# Patient Record
Sex: FEMALE | Race: WHITE | NOT HISPANIC OR LATINO | Employment: FULL TIME | RURAL
[De-identification: names, ages, dates, MRNs, and addresses within clinical notes are randomized per-mention and may not be internally consistent; named-entity substitution may affect disease eponyms.]

---

## 2024-02-19 ENCOUNTER — OFFICE VISIT (OUTPATIENT)
Dept: FAMILY MEDICINE CLINIC | Facility: CLINIC | Age: 44
End: 2024-02-19
Payer: COMMERCIAL

## 2024-02-19 VITALS
HEART RATE: 83 BPM | WEIGHT: 122.06 LBS | OXYGEN SATURATION: 99 % | BODY MASS INDEX: 20.34 KG/M2 | SYSTOLIC BLOOD PRESSURE: 136 MMHG | HEIGHT: 65 IN | DIASTOLIC BLOOD PRESSURE: 88 MMHG

## 2024-02-19 DIAGNOSIS — R22.32 MASS OF LEFT WRIST: ICD-10-CM

## 2024-02-19 DIAGNOSIS — I25.2 HISTORY OF MI (MYOCARDIAL INFARCTION): ICD-10-CM

## 2024-02-19 DIAGNOSIS — R20.2 PARESTHESIA: Primary | ICD-10-CM

## 2024-02-19 DIAGNOSIS — I25.10 CORONARY ARTERY DISEASE INVOLVING NATIVE CORONARY ARTERY OF NATIVE HEART WITHOUT ANGINA PECTORIS: ICD-10-CM

## 2024-02-19 DIAGNOSIS — M54.2 NECK PAIN: ICD-10-CM

## 2024-02-19 PROCEDURE — 1160F RVW MEDS BY RX/DR IN RCRD: CPT | Performed by: NURSE PRACTITIONER

## 2024-02-19 PROCEDURE — 1159F MED LIST DOCD IN RCRD: CPT | Performed by: NURSE PRACTITIONER

## 2024-02-19 PROCEDURE — 99203 OFFICE O/P NEW LOW 30 MIN: CPT | Performed by: NURSE PRACTITIONER

## 2024-02-19 RX ORDER — FLUOXETINE HYDROCHLORIDE 20 MG/1
60 CAPSULE ORAL DAILY
COMMUNITY
Start: 2023-10-16

## 2024-02-19 RX ORDER — BUSPIRONE HYDROCHLORIDE 30 MG/1
TABLET ORAL
COMMUNITY
Start: 2023-10-16

## 2024-02-19 RX ORDER — PANTOPRAZOLE SODIUM 20 MG/1
TABLET, DELAYED RELEASE ORAL
COMMUNITY
Start: 2023-11-05

## 2024-02-19 RX ORDER — ATORVASTATIN CALCIUM 40 MG/1
1 TABLET, FILM COATED ORAL NIGHTLY
COMMUNITY
Start: 2023-10-20

## 2024-02-19 RX ORDER — PREDNISONE 20 MG/1
TABLET ORAL
Qty: 14 TABLET | Refills: 0 | Status: SHIPPED | OUTPATIENT
Start: 2024-02-19

## 2024-02-19 NOTE — ASSESSMENT & PLAN NOTE
X-ray completed.  Will let know if radiologist sees anything.  We will try course of prednisone.  Avoid NSAIDs while on prednisone.  She states she does fine with prednisone and safe to take.  Will place orthopedic referral.  Go to ED if worsens.  Return to clinic or ED with any issues or concerns.

## 2024-02-19 NOTE — PROGRESS NOTES
"Chief Complaint  Hands Numb    Manish Maya presents to [facility]  History of Present Illness    Patient presents with concerns of numbness in bilateral hands mainly in the middle finger and thumb for the past couple of weeks.  No redness warmth no swelling.  States she does have a history of carpal tunnel and is unsure if it is just related to that but states this is different than usual.  States for over a year she has had soft nontender masses present to the underside and top of her left wrist.  Would like a referral to a specialist to have these masses evaluated further.  She also states that all this started she noticed she did something to her neck and was having posterior neck pain which continues but is improving.  Is unsure if this numbness in the hand is from carpal tunnel or if it could be something in her neck.  She states her  seems off as well due to the numbness.  Good pulses.  Good capillary refill.  No redness warmth no swelling.  No obvious injury.    She also states she will be a new patient here and plans on following up within the next month or so for routine physical and preventative exam.  She states she does have a history of degenerative disc disease as well as coronary artery disease with a history of a heart attack.  She states she was seeing specialist up in [facility] but is now down here so is requesting I go ahead and put a referral in so that she can establish care with a cardiologist around here.  No dizziness no headache no chest pain no chest pressure no shortness of breath no trouble breathing no urinary or bowel issues.    Objective   Vital Signs:   /88   Pulse 100   Ht 165.1 cm (65\")   Wt 55.4 kg (122 lb 1 oz)   SpO2 99%   BMI 20.31 kg/m²     Body mass index is 20.31 kg/m².    Review of Systems   Constitutional:  Negative for chills, fatigue and fever.   HENT:  Negative for congestion.    Eyes:  Negative " for visual disturbance.   Respiratory:  Negative for cough, shortness of breath and wheezing.    Cardiovascular: Negative.    Gastrointestinal:  Negative for abdominal pain, diarrhea, nausea and vomiting.   Genitourinary:  Negative for decreased urine volume, dysuria, frequency, hematuria and urgency.   Musculoskeletal:  Positive for neck pain. Negative for back pain.   Skin:  Negative for color change, rash and wound.   Neurological:  Positive for numbness. Negative for dizziness, weakness and headache.   Psychiatric/Behavioral:  Negative for suicidal ideas.        Past History:  Medical History: has no past medical history on file.   Surgical History: has no past surgical history on file.   Family History: family history is not on file.   Social History: reports that she has been smoking cigarettes. She has been smoking an average of .25 packs per day. She has never used smokeless tobacco.    PHQ-2 Depression Screening  Little interest or pleasure in doing things? 0-->not at all   Feeling down, depressed, or hopeless? 0-->not at all   PHQ-2 Total Score 0        PHQ-9 Depression Screening  Little interest or pleasure in doing things? 0-->not at all   Feeling down, depressed, or hopeless? 0-->not at all   Trouble falling or staying asleep, or sleeping too much?     Feeling tired or having little energy?     Poor appetite or overeating?     Feeling bad about yourself - or that you are a failure or have let yourself or your family down?     Trouble concentrating on things, such as reading the newspaper or watching television?     Moving or speaking so slowly that other people could have noticed? Or the opposite - being so fidgety or restless that you have been moving around a lot more than usual?     Thoughts that you would be better off dead, or of hurting yourself in some way?     PHQ-9 Total Score 0   If you checked off any problems, how difficult have these problems made it for you to do your work, take care of  things at home, or get along with other people?       PHQ-9 Total Score: 0      Patient screened positive for depression based on a PHQ-9 score of 0 on 2/19/2024. Follow-up recommendations include:          Current Outpatient Medications:     atorvastatin (LIPITOR) 40 MG tablet, Take 1 tablet by mouth Every Night., Disp: , Rfl:     busPIRone (BUSPAR) 30 MG tablet, TAKE ONE (1) TABLET BY MOUTH TWICE DAILY *PATIENT NEEDS APPOINTMENT FOR FURTHER REFILLS*, Disp: , Rfl:     FLUoxetine (PROzac) 20 MG capsule, Take 3 capsules by mouth Daily., Disp: , Rfl:     metoprolol tartrate (LOPRESSOR) 25 MG tablet, Take 1 tablet by mouth 2 (Two) Times a Day., Disp: , Rfl:     pantoprazole (PROTONIX) 20 MG EC tablet, , Disp: , Rfl:     predniSONE (DELTASONE) 20 MG tablet, Take 2 tabs PO qd x 5 days then 1 tab PO qd x 3 days then 0.5 tab PO qd x 2 days, Disp: 14 tablet, Rfl: 0   (Not in a hospital admission)     Allergies: Lisinopril, Morphine, Doxycycline, Aspirin, Duloxetine, Hydrocodone, Methadone, and Pregabalin    Physical Exam  Constitutional:       Appearance: Normal appearance.   Eyes:      Conjunctiva/sclera: Conjunctivae normal.      Pupils: Pupils are equal, round, and reactive to light.   Cardiovascular:      Rate and Rhythm: Normal rate and regular rhythm.      Pulses: Normal pulses.      Heart sounds: Normal heart sounds.   Pulmonary:      Effort: Pulmonary effort is normal.      Breath sounds: Normal breath sounds.   Musculoskeletal:      Right wrist: Normal.      Left wrist: Normal.      Right hand: Normal.      Left hand: Normal.      Cervical back: No swelling, edema, deformity, erythema, rigidity, spasms, bony tenderness or crepitus. Pain with movement present. Normal range of motion.      Comments: States posterior neck feels stiff with ROM    Skin:     General: Skin is warm.      Findings: No erythema or rash.      Comments: Soft small non-tender mass present to underside and top of left wrist. No redness, no  warmth   Neurological:      General: No focal deficit present.      Mental Status: She is alert and oriented to person, place, and time. Mental status is at baseline.      Sensory: No sensory deficit.      Motor: No weakness.   Psychiatric:         Mood and Affect: Mood normal.         Behavior: Behavior normal.         Thought Content: Thought content normal.         Judgment: Judgment normal.          Result Review :                   Assessment and Plan    Diagnoses and all orders for this visit:    1. Paresthesia (Primary)  Assessment & Plan:  X-ray completed.  Will let know if radiologist sees anything.  We will try course of prednisone.  Avoid NSAIDs while on prednisone.  She states she does fine with prednisone and safe to take.  Will place orthopedic referral.  Will order nerve conduction study.  Informed patient to call afterwards for results.  Go to ED if worsens.  Return to clinic or ED with any issues or concerns.    Orders:  -     XR Spine Cervical 2 or 3 View; Future  -     predniSONE (DELTASONE) 20 MG tablet; Take 2 tabs PO qd x 5 days then 1 tab PO qd x 3 days then 0.5 tab PO qd x 2 days  Dispense: 14 tablet; Refill: 0  -     Ambulatory Referral to Orthopedic Surgery  -     Nerve Conduction Test; Future    2. Neck pain  Assessment & Plan:  X-ray completed.  Will let know if radiologist sees anything.  We will try course of prednisone.  Avoid NSAIDs while on prednisone.  She states she does fine with prednisone and safe to take.  Will place orthopedic referral.  Go to ED if worsens.  Return to clinic or ED with any issues or concerns.    Orders:  -     XR Spine Cervical 2 or 3 View; Future  -     predniSONE (DELTASONE) 20 MG tablet; Take 2 tabs PO qd x 5 days then 1 tab PO qd x 3 days then 0.5 tab PO qd x 2 days  Dispense: 14 tablet; Refill: 0  -     Ambulatory Referral to Orthopedic Surgery    3. Coronary artery disease involving native coronary artery of native heart without angina  pectoris  Assessment & Plan:  Continue current medications.  Will place cardiology referral.  Proper diet and exercise plan discussed and encouraged.  Smoking cessation discussed and encouraged.  Patient states she will follow-up within the next month for routine physical and routine checkup and states she will do all preventative measures and blood work at that appointment.  Return to clinic or ED with any issues or concerns.    Orders:  -     Ambulatory Referral to Cardiology    4. History of MI (myocardial infarction)  Assessment & Plan:  Continue current medications.  Will place cardiology referral.  Proper diet and exercise plan discussed and encouraged.  Smoking cessation discussed and encouraged.  Patient states she will follow-up within the next month for routine physical and routine checkup and states she will do all preventative measures and blood work at that appointment.  Return to clinic or ED with any issues or concerns.    Orders:  -     Ambulatory Referral to Cardiology    5. Mass of left wrist  Assessment & Plan:  Will place orthopedic referral for further evaluation.    Orders:  -     Ambulatory Referral to Orthopedic Surgery              BMI is within normal parameters. No other follow-up for BMI required.       Follow Up   Return in about 2 weeks (around 3/4/2024), or if symptoms worsen or fail to improve, for Annual physical.  Patient was given instructions and counseling regarding her condition or for health maintenance advice. Please see specific information pulled into the AVS if appropriate.     MERYL Israel

## 2024-02-19 NOTE — ASSESSMENT & PLAN NOTE
Continue current medications.  Will place cardiology referral.  Proper diet and exercise plan discussed and encouraged.  Smoking cessation discussed and encouraged.  Patient states she will follow-up within the next month for routine physical and routine checkup and states she will do all preventative measures and blood work at that appointment.  Return to clinic or ED with any issues or concerns.

## 2024-02-19 NOTE — ASSESSMENT & PLAN NOTE
X-ray completed.  Will let know if radiologist sees anything.  We will try course of prednisone.  Avoid NSAIDs while on prednisone.  She states she does fine with prednisone and safe to take.  Will place orthopedic referral.  Will order nerve conduction study.  Informed patient to call afterwards for results.  Go to ED if worsens.  Return to clinic or ED with any issues or concerns.

## 2024-02-23 DIAGNOSIS — M54.2 NECK PAIN: Primary | ICD-10-CM

## 2024-02-23 DIAGNOSIS — R20.2 PARESTHESIA: ICD-10-CM

## 2024-02-26 ENCOUNTER — OFFICE VISIT (OUTPATIENT)
Dept: ORTHOPEDIC SURGERY | Facility: CLINIC | Age: 44
End: 2024-02-26
Payer: COMMERCIAL

## 2024-02-26 ENCOUNTER — TELEPHONE (OUTPATIENT)
Dept: FAMILY MEDICINE CLINIC | Facility: CLINIC | Age: 44
End: 2024-02-26

## 2024-02-26 VITALS
SYSTOLIC BLOOD PRESSURE: 124 MMHG | WEIGHT: 122.14 LBS | DIASTOLIC BLOOD PRESSURE: 86 MMHG | BODY MASS INDEX: 20.35 KG/M2 | HEIGHT: 65 IN

## 2024-02-26 DIAGNOSIS — G56.21 CUBITAL TUNNEL SYNDROME ON RIGHT: ICD-10-CM

## 2024-02-26 DIAGNOSIS — G56.03 BILATERAL CARPAL TUNNEL SYNDROME: Primary | ICD-10-CM

## 2024-02-26 PROCEDURE — 1160F RVW MEDS BY RX/DR IN RCRD: CPT | Performed by: STUDENT IN AN ORGANIZED HEALTH CARE EDUCATION/TRAINING PROGRAM

## 2024-02-26 PROCEDURE — 1159F MED LIST DOCD IN RCRD: CPT | Performed by: STUDENT IN AN ORGANIZED HEALTH CARE EDUCATION/TRAINING PROGRAM

## 2024-02-26 PROCEDURE — 99204 OFFICE O/P NEW MOD 45 MIN: CPT | Performed by: STUDENT IN AN ORGANIZED HEALTH CARE EDUCATION/TRAINING PROGRAM

## 2024-02-26 NOTE — PROGRESS NOTES
"                                                                 [facility]     Office Visit       Date: 02/26/2024   Patient Name: Samina Maya  MRN: [id number]  YOB: 1980    Referring Physician: Sudhir Gan AP*     Chief Complaint:   Chief Complaint   Patient presents with    Left Wrist - Pain     History of Present Illness:   Samina Maya is a 43 y.o. female right-hand-dominant presented clinic with complaints of left greater than right hand numbness and tingling.  She reports is been present intermittently for several years but is slowly worsening with time.  She endorses numbness and tingling that occurs at night causing her to shake out her hands.  This involves the thumb index and long fingers on the left side and all the digits on the right side.  She has been attempting nighttime bracing with minimal improvements.  She now endorses symptoms throughout the day that are worse in the left hand.  This is also associated with a dull ache.  She denies any injury or trauma.  She has had no prior EMG studies, however her PCP has recently ordered one to be performed this week.  No prior injections.  No other complaints or concerns.    No personal history of diabetes.    Subjective   Review of Systems:   Review of Systems   Pertinent review of systems per HPI    I reviewed the patient's chief complaint, history of present illness, review of systems, past medical history, surgical history, family history, social history, medications and allergy list in the EMR on 02/26/2024 and agree with the findings above.    Objective    Quality Measures:   ACP:   ACP discussion was declined by the patient.      Tobacco:   Samina Maya  reports that she has been smoking cigarettes. She has been smoking an average of .25 packs per day. She has never used smokeless tobacco..     Vital Signs:   Vitals:    02/26/24 1444   BP: 124/86   Weight: 55.4 kg (122 lb 2.2 oz)   Height: 165.1 cm (65\") "     BMI: BMI is within normal parameters. No other follow-up for BMI required.     General: No acute distress. Alert and oriented.     Ortho Exam:  Lamination of bilateral upper extremities demonstrates no deformity.  No skin lesions or abrasions.  There is no atrophy of the thenar or hypothenar eminences.  Close nontender palpation.  She did make composite fist and oppose thumb to small finger.  Positive Tinel, Durkan's, Phalen's at bilateral wrist.  Positive Tinel's at the right elbow with negative Tinel's at the left elbow.  The ulnar nerve is tender to palpation on the right with perching of the ulnar along the medial epicondyle.  5/5 APB and FDI strength bilaterally.  2-point stimulation right long finger is 6 mm and 2 point discrimination left long finger is 7 mm.  Sensation is decreased at the median nerves bilaterally.  Station is decreased throughout the right ulnar nerve distribution and intact on the left.  Warm and well-perfused distally.    CTS-6 Questionnaire         Left Hand  Symptoms and History  Numbness in the median nerve territory  3.5 (3.5)   Nocturnal numbness     4 (4)   Physical Examination  Thenar atrophy and/or weakness   0 (5)    Positive Phalen's test     5 (5)   Loss of 2-point Discrimination >5 mm  4.5 (4.5)  Positive Tinel sign     4 (4)                Total    21 (26)           Right Hand  Symptoms and History  Numbness in the median nerve territory  3.5 (3.5)   Nocturnal numbness     4 (4)   Physical Examination  Thenar atrophy and/or weakness   0 (5)    Positive Phalen's test     5 (5)   Loss of 2-point Discrimination >5 mm  4.5 (4.5)  Positive Tinel sign     4 (4)     Total    21 (26)    A patient with a score of > 12 has an 80% probability of electrodiagnostically positive carpal tunnel syndrome.     A patient with a score of > 5 has an 25% probability of electrodiagnostically positive carpal tunnel syndrome.                Imaging / Studies:    Imaging Results (Last 24 Hours)        ** No results found for the last 24 hours. **          Assessment / Plan    Assessment/Plan:   Samina Maya is a 43 y.o. female with bilateral carpal tunnel syndrome and right cubital tunnel syndrome.    I discussed with the patient their clinical findings demonstrate carpal tunnel syndrome bilaterally and right cubital tunnel syndrome.  The pathophysiology of the condition, including compression of the median nerve in the carpal tunnel and ulnar nerve at the cubital tunnel, was explained in detail.  It was also discussed that with more severe symptomatology, such as persistent and worsening paresthesias, that permanent nerve damage may result, which may make improvement after surgery less predictable.  Both conservative and surgical options were discussed.  Conservative treatments in the form of: observation, gentle nerve gliding exercises, night time splinting, nighttime elbow extension splinting and injection were presented.  Operative treatment in the form of open carpal tunnel release and cubital tunnel release was presented and reiterated that the goal of surgery is to prevent further compression and damage to the nerve.  I also discussed that with decompression of the ulnar nerve at the elbow this may require anterior transposition based on stability. Further workup with electrodiagnostic studies was also discussed and recommended to determine severity.  Scheduled to have this test done later this week and I will see her back next week with results.  In the meantime she may continue nighttime bracing, a new left wrist splint was provided.  I also demonstrated gentle nerve gliding exercises to be performed at home.  I will see her back in 1 week with her EMG results.  They were agreeable with the plan.  All questions and concerns were addressed.      ICD-10-CM ICD-9-CM   1. Bilateral carpal tunnel syndrome  G56.03 354.0   2. Cubital tunnel syndrome on right  G56.21 354.2     Follow Up:   Return in about  1 week (around 3/4/2024) for Follow Up.      Jennifer Perez MD  [facility]

## 2024-02-26 NOTE — TELEPHONE ENCOUNTER
Caller: Samina Maya    Relationship to patient: Self    Best call back number:     [phone] (Mobile)     Type of visit:  PHYSICAL     Requested date: ANYTIME JUST EARLY MORNING     If rescheduling, when is the original appointment: 2-26-24    Additional notes:  PLEASE CALL TO RESCHEDULE   PATIENT WAS SICK ALL THROUGH THE NIGHT   AND CANNOT MAKE APPOINTMENT TODAY 2-26-24  SHE CALLED AT 8:01

## 2024-02-29 ENCOUNTER — OFFICE VISIT (OUTPATIENT)
Dept: FAMILY MEDICINE CLINIC | Facility: CLINIC | Age: 44
End: 2024-02-29
Payer: COMMERCIAL

## 2024-02-29 VITALS
HEART RATE: 71 BPM | OXYGEN SATURATION: 99 % | BODY MASS INDEX: 20.39 KG/M2 | HEIGHT: 65 IN | WEIGHT: 122.38 LBS | SYSTOLIC BLOOD PRESSURE: 138 MMHG | DIASTOLIC BLOOD PRESSURE: 88 MMHG

## 2024-02-29 DIAGNOSIS — I25.2 HISTORY OF MI (MYOCARDIAL INFARCTION): ICD-10-CM

## 2024-02-29 DIAGNOSIS — K21.9 GASTROESOPHAGEAL REFLUX DISEASE, UNSPECIFIED WHETHER ESOPHAGITIS PRESENT: ICD-10-CM

## 2024-02-29 DIAGNOSIS — Z23 IMMUNIZATION DUE: ICD-10-CM

## 2024-02-29 DIAGNOSIS — Z79.899 ENCOUNTER FOR LONG-TERM (CURRENT) USE OF OTHER MEDICATIONS: ICD-10-CM

## 2024-02-29 DIAGNOSIS — E78.2 MIXED HYPERLIPIDEMIA: ICD-10-CM

## 2024-02-29 DIAGNOSIS — Z12.4 SCREENING FOR CERVICAL CANCER: ICD-10-CM

## 2024-02-29 DIAGNOSIS — R20.2 PARESTHESIA: ICD-10-CM

## 2024-02-29 DIAGNOSIS — M54.2 NECK PAIN: ICD-10-CM

## 2024-02-29 DIAGNOSIS — Z12.31 ENCOUNTER FOR SCREENING MAMMOGRAM FOR MALIGNANT NEOPLASM OF BREAST: ICD-10-CM

## 2024-02-29 DIAGNOSIS — F41.9 ANXIETY: ICD-10-CM

## 2024-02-29 DIAGNOSIS — I25.10 CORONARY ARTERY DISEASE INVOLVING NATIVE CORONARY ARTERY OF NATIVE HEART WITHOUT ANGINA PECTORIS: ICD-10-CM

## 2024-02-29 DIAGNOSIS — Z11.59 NEED FOR HEPATITIS C SCREENING TEST: ICD-10-CM

## 2024-02-29 DIAGNOSIS — Z80.0 FAMILY HISTORY OF COLON CANCER: ICD-10-CM

## 2024-02-29 DIAGNOSIS — Z00.00 ANNUAL PHYSICAL EXAM: Primary | ICD-10-CM

## 2024-02-29 DIAGNOSIS — R82.90 NONSPECIFIC FINDING ON EXAMINATION OF URINE: ICD-10-CM

## 2024-02-29 DIAGNOSIS — G56.03 BILATERAL CARPAL TUNNEL SYNDROME: ICD-10-CM

## 2024-02-29 LAB
BILIRUB BLD-MCNC: NEGATIVE MG/DL
CLARITY, POC: CLEAR
COLOR UR: YELLOW
EXPIRATION DATE: ABNORMAL
GLUCOSE UR STRIP-MCNC: NEGATIVE MG/DL
KETONES UR QL: NEGATIVE
LEUKOCYTE EST, POC: ABNORMAL
Lab: ABNORMAL
NITRITE UR-MCNC: POSITIVE MG/ML
PH UR: 7 [PH] (ref 5–8)
PROT UR STRIP-MCNC: NEGATIVE MG/DL
RBC # UR STRIP: ABNORMAL /UL
SP GR UR: 1.02 (ref 1–1.03)
UROBILINOGEN UR QL: NORMAL

## 2024-02-29 RX ORDER — NITROFURANTOIN 25; 75 MG/1; MG/1
100 CAPSULE ORAL 2 TIMES DAILY
Qty: 10 CAPSULE | Refills: 0 | Status: SHIPPED | OUTPATIENT
Start: 2024-02-29

## 2024-02-29 NOTE — ASSESSMENT & PLAN NOTE
Fasting labs drawn.  UA completed.  Goal blood pressure less than 140/90.  Let me know if gets to or above that.  PHQ-9 completed and discussed.  No thoughts of suicide or hurting herself or anyone else.  Proper diet and exercise plan discussed and encouraged.  Annual dental and eye exams encouraged.  Will schedule mammogram.  She states up-to-date on Pap smears and will follow-up with gynecology for further Pap smears.  Will place GI referral to discuss GERD and the possibility of a colonoscopy due to her family history.  Smoking cessation discussed and encouraged.  Patient states she had a pneumonia vaccine last year.  Tdap vaccine given today in clinic.  Vaccine information sheet given.  Risk discussed and understood.  Patient tolerated well.  Patient denies COVID and flu vaccines.  Risk of meds discussed and understood.  Education provided.  Return to clinic or ED with any issues or concerns.

## 2024-02-29 NOTE — LETTER
[facility]  Vaccine Consent Form    Patient Name:  Samina Maya  Patient :  1980     Vaccine(s) Ordered    Tdap Vaccine => 8yo IM (BOOSTRIX)        Screening Checklist  The following questions should be completed prior to vaccination. If you answer “yes” to any question, it does not necessarily mean you should not be vaccinated. It just means we may need to clarify or ask more questions. If a question is unclear, please ask your healthcare provider to explain it.    Yes No   Any fever or moderate to severe illness today (mild illness and/or antibiotic treatment are not contraindications)?     Do you have a history of a serious reaction to any previous vaccinations, such as anaphylaxis, encephalopathy within 7 days, Guillain-Emmett syndrome within 6 weeks, seizure?     Have you received any live vaccine(s) (e.g MMR, ESPINOZA) or any other vaccines in the last month (to ensure duplicate doses aren't given)?     Do you have an anaphylactic allergy to latex (DTaP, DTaP-IPV, Hep A, Hep B, MenB, RV, Td, Tdap), baker’s yeast (Hep B, HPV), polysorbates (RSV, nirsevimab, PCV 20, Rotavirrus, Tdap, Shingrix), or gelatin (ESPINOZA, MMR)?     Do you have an anaphylactic allergy to neomycin (Rabies, ESPINOZA, MMR, IPV, Hep A), polymyxin B (IPV), or streptomycin (IPV)?      Any cancer, leukemia, AIDS, or other immune system disorder? (ESPINOZA, MMR, RV)     Do you have a parent, brother, or sister with an immune system problem (if immune competence of vaccine recipient clinically verified, can proceed)? (MMR, ESPINOZA)     Any recent steroid treatments for >2 weeks, chemotherapy, or radiation treatment? (ESPINOZA, MMR)     Have you received antibody-containing blood transfusions or IVIG in the past 11 months (recommended interval is dependent on product)? (MMR, ESPINOZA)     Have you taken antiviral drugs (acyclovir, famciclovir, valacyclovir for ESPINOZA) in the last 24 or 48 hours, respectively?      Are you pregnant or planning to become pregnant within 1  "month? (ESPINOZA, MMR, HPV, IPV, MenB, Abrexvy; For Hep B- refer to Engerix-B; For RSV - Abrysvo is indicated for 32-36 weeks of pregnancy from September to January)     For infants, have you ever been told your child has had intussusception or a medical emergency involving obstruction of the intestine (Rotavirus)? If not for an infant, can skip this question.         *Ordering Physicians/APC should be consulted if \"yes\" is checked by the patient or guardian above.  I have received, read, and understand the Vaccine Information Statement (VIS) for each vaccine ordered.  I have considered my or my child's health status as well as the health status of my close contacts.  I have taken the opportunity to discuss my vaccine questions with my or my child's health care provider.   I have requested that the ordered vaccine(s) be given to me or my child.  I understand the benefits and risks of the vaccines.  I understand that I should remain in the clinic for 15 minutes after receiving the vaccine(s).  _________________________________________________________  Signature of Patient or Parent/Legal Guardian ____________________  Date     "

## 2024-02-29 NOTE — ASSESSMENT & PLAN NOTE
Continue to follow-up with all specialist including both of her orthopedist Dr. Gustafson and Dr. Perez.

## 2024-02-29 NOTE — ASSESSMENT & PLAN NOTE
Labs drawn.  Continue current meds.  Continue to follow-up with cardiology as scheduled.  Proper diet and exercise plan discussed and encouraged.  Risk of meds discussed understood.  Education provided.  Return to clinic or ED with any issues or concerns.

## 2024-02-29 NOTE — PROGRESS NOTES
"Chief Complaint  Annual Exam    Subjective          Samina Maya presents to [facility] for preventative yearly exam.   History of Present Illness    Presents for annual exam.  Is fasting.  She does smoke and states it is around 5 cigarettes or less per day.  Tries to watch her diet and she does stay active.  No dizziness no headache no chest pain no chest pressure no shortness of breath no trouble breathing no urinary or bowel issues.    Past history of coronary artery disease hypertension hyperlipidemia past MI GERD anxiety carpal tunnel and neck pain.  She does continue to follow-up regularly with her cardiologist Dr. Day as well as her spine specialist Dr. Gustafson and states she will also be seeing othopedics dr. Perez for her carpal tunnel.     She does have history of GERD and states for the most part it is well-controlled on Protonix but states she does occasionally still get indigestion and heartburn.  She does have a family history of colon cancer with her grandmother.  She would like referral to GI to discuss her GERD and to also discuss the possibility of getting a colonoscopy due to her family history.    She is due a mammogram.  States she had a Pap smear last year.  She states she had a pneumonia vaccine last year.  She would like a tetanus vaccine today which includes the Tdap.  Denies COVID and flu vaccines.    Objective   Vital Signs:   /88   Pulse 71   Ht 165.1 cm (65\")   Wt 55.5 kg (122 lb 6 oz)   SpO2 99%   BMI 20.36 kg/m²     Body mass index is 20.36 kg/m².    Predictive Model Details   No score data available for Risk of Fall        PHQ-9 Depression Screening  Little interest or pleasure in doing things?     Feeling down, depressed, or hopeless?     Trouble falling or staying asleep, or sleeping too much?     Feeling tired or having little energy?     Poor appetite or overeating?     Feeling bad about yourself - or that you are a failure or have " let yourself or your family down?     Trouble concentrating on things, such as reading the newspaper or watching television?     Moving or speaking so slowly that other people could have noticed? Or the opposite - being so fidgety or restless that you have been moving around a lot more than usual?     Thoughts that you would be better off dead, or of hurting yourself in some way?     PHQ-9 Total Score     If you checked off any problems, how difficult have these problems made it for you to do your work, take care of things at home, or get along with other people?       Patient screened positive for depression based on a PHQ-9 score of 0 on 2/19/2024. Follow-up recommendations include:        Immunization History   Administered Date(s) Administered    COVID-19 (PFIZER) Purple Cap Monovalent 12/13/2021    Covid-19 (Pfizer) Gray Cap Monovalent 01/31/2022    Fluzone (or Fluarix & Flulaval for VFC) >6mos 09/29/2015    Influenza Quad Vaccine (Inpatient) 09/29/2015    Influenza, Unspecified 02/01/2012, 11/23/2013, 10/20/2018, 09/10/2020    Pneumococcal Conjugate 20-Valent (PCV20) 08/18/2023    Pneumococcal Polysaccharide (PPSV23) 11/17/2013    Td, Unspecified 02/01/2012    Tdap 03/06/2012       Review of Systems   Constitutional: Negative.  Negative for chills, fatigue and fever.   HENT: Negative.     Eyes: Negative.    Respiratory: Negative.     Cardiovascular: Negative.    Gastrointestinal: Negative.    Endocrine: Negative for polydipsia, polyphagia and polyuria.   Genitourinary: Negative.    Musculoskeletal: Negative.    Skin: Negative.    Neurological: Negative.    Psychiatric/Behavioral: Negative.         Past History:  Medical History: has no past medical history on file.   Surgical History: has no past surgical history on file.   Family History: family history is not on file.   Social History: reports that she has been smoking cigarettes. She has been smoking an average of .25 packs per day. She has never used  smokeless tobacco.      Current Outpatient Medications:     atorvastatin (LIPITOR) 40 MG tablet, Take 1 tablet by mouth Every Night., Disp: , Rfl:     busPIRone (BUSPAR) 30 MG tablet, TAKE ONE (1) TABLET BY MOUTH TWICE DAILY *PATIENT NEEDS APPOINTMENT FOR FURTHER REFILLS*, Disp: , Rfl:     FLUoxetine (PROzac) 20 MG capsule, Take 3 capsules by mouth Daily., Disp: , Rfl:     metoprolol tartrate (LOPRESSOR) 25 MG tablet, Take 1 tablet by mouth 2 (Two) Times a Day., Disp: , Rfl:     pantoprazole (PROTONIX) 20 MG EC tablet, , Disp: , Rfl:    Allergies: Lisinopril, Morphine, Doxycycline, Aspirin, Duloxetine, Hydrocodone, Methadone, and Pregabalin    Physical Exam  Constitutional:       Appearance: Normal appearance.   HENT:      Head: Normocephalic.      Right Ear: Tympanic membrane, ear canal and external ear normal.      Left Ear: Tympanic membrane, ear canal and external ear normal.      Nose: Nose normal.      Mouth/Throat:      Mouth: Mucous membranes are moist.      Pharynx: Oropharynx is clear.   Eyes:      Extraocular Movements: Extraocular movements intact.      Conjunctiva/sclera: Conjunctivae normal.      Pupils: Pupils are equal, round, and reactive to light.   Cardiovascular:      Rate and Rhythm: Normal rate and regular rhythm.      Heart sounds: Normal heart sounds.   Pulmonary:      Effort: Pulmonary effort is normal.      Breath sounds: Normal breath sounds.   Abdominal:      General: Abdomen is flat. Bowel sounds are normal. There is no distension.      Palpations: Abdomen is soft.      Tenderness: There is no abdominal tenderness. There is no guarding or rebound.   Genitourinary:     Comments: Denied   Musculoskeletal:         General: Normal range of motion.      Cervical back: Normal range of motion.   Skin:     General: Skin is warm.   Neurological:      General: No focal deficit present.      Mental Status: She is alert and oriented to person, place, and time. Mental status is at baseline.    Psychiatric:         Mood and Affect: Mood normal.         Behavior: Behavior normal.         Thought Content: Thought content normal.         Judgment: Judgment normal.          Result Review :                     Assessment and Plan    Diagnoses and all orders for this visit:    1. Annual physical exam (Primary)  Assessment & Plan:  Fasting labs drawn.  UA completed.  Goal blood pressure less than 140/90.  Let me know if gets to or above that.  PHQ-9 completed and discussed.  No thoughts of suicide or hurting herself or anyone else.  Proper diet and exercise plan discussed and encouraged.  Annual dental and eye exams encouraged.  Will schedule mammogram.  She states up-to-date on Pap smears and will follow-up with gynecology for further Pap smears.  Will place GI referral to discuss GERD and the possibility of a colonoscopy due to her family history.  Smoking cessation discussed and encouraged.  Patient states she had a pneumonia vaccine last year.  Tdap vaccine given today in clinic.  Vaccine information sheet given.  Risk discussed and understood.  Patient tolerated well.  Patient denies COVID and flu vaccines.  Risk of meds discussed and understood.  Education provided.  Return to clinic or ED with any issues or concerns.    Orders:  -     CBC & Differential  -     Comprehensive Metabolic Panel  -     TSH Rfx On Abnormal To Free T4  -     POC Urinalysis Dipstick, Automated; Future    2. History of MI (myocardial infarction)  Assessment & Plan:  Labs drawn.  Continue current meds.  Continue to follow-up with cardiology as scheduled.  Proper diet and exercise plan discussed and encouraged.  Risk of meds discussed understood.  Education provided.  Return to clinic or ED with any issues or concerns.      3. Coronary artery disease involving native coronary artery of native heart without angina pectoris  Assessment & Plan:  Labs drawn.  Continue current meds.  Continue to follow-up with cardiology as scheduled.   Proper diet and exercise plan discussed and encouraged.  Risk of meds discussed understood.  Education provided.  Return to clinic or ED with any issues or concerns.      4. Mixed hyperlipidemia  Assessment & Plan:  Labs drawn.  Continue current meds.  Continue to follow-up with cardiology as scheduled.  Proper diet and exercise plan discussed and encouraged.  Risk of meds discussed understood.  Education provided.  Return to clinic or ED with any issues or concerns.    Orders:  -     Lipid Panel    5. Neck pain  Assessment & Plan:  Continue to follow-up with all specialist including both of her orthopedist Dr. Gustafson and Dr. Perez.       6. Bilateral carpal tunnel syndrome  Assessment & Plan:  Continue to follow-up with all specialist including both of her orthopedist Dr. Gustafson and Dr. Perez.       7. Paresthesia  Assessment & Plan:  Continue to follow-up with all specialist including both of her orthopedist Dr. Gustafson and Dr. Perez.       8. Need for hepatitis C screening test  -     Hepatitis C Antibody    9. Family history of colon cancer  Assessment & Plan:  Will place GI referral.    Orders:  -     Ambulatory Referral to Gastroenterology    10. Gastroesophageal reflux disease, unspecified whether esophagitis present  Assessment & Plan:  Patient denies needing to have her Protonix increased.  Would like GI referral so I will get that placed.  Proper diet and exercise plan discussed and encouraged.  Risk discussed and understood.  Return to clinic or ED with any issues or concerns.    Orders:  -     Ambulatory Referral to Gastroenterology    11. Screening for cervical cancer  Assessment & Plan:  Patient states she had a Pap smear last year in 2023.  States she will follow-up with gynecology when she is due a repeat.      12. Encounter for screening mammogram for malignant neoplasm of breast  Assessment & Plan:  Schedule mammogram.    Orders:  -     Mammo Screening Digital Tomosynthesis Bilateral  With CAD; Future    13. Anxiety    14. Immunization due  -     Tdap Vaccine => 8yo IM (BOOSTRIX); Future    15. Encounter for long-term (current) use of other medications  -     Magnesium              BMI is within normal parameters. No other follow-up for BMI required.       Follow Up   Return in about 6 months (around 8/29/2024), or if symptoms worsen or fail to improve.  Patient was given instructions and counseling regarding her condition or for health maintenance advice. Please see specific information pulled into the AVS if appropriate.     MERYL Israel

## 2024-02-29 NOTE — ASSESSMENT & PLAN NOTE
Patient denies needing to have her Protonix increased.  Would like GI referral so I will get that placed.  Proper diet and exercise plan discussed and encouraged.  Risk discussed and understood.  Return to clinic or ED with any issues or concerns.

## 2024-02-29 NOTE — ASSESSMENT & PLAN NOTE
Multiple calls made by crisis in an attempt to contact pt's RN so a copy of pt's 201 can be faxed so a bed search can be done  Crisis worker requesting [phone] can be reached at [phone]    Crisis will continue to attempt contact Patient states she had a Pap smear last year in 2023.  States she will follow-up with gynecology when she is due a repeat.

## 2024-03-01 LAB
ALBUMIN SERPL-MCNC: 4.6 G/DL (ref 3.9–4.9)
ALBUMIN/GLOB SERPL: 1.9 {RATIO} (ref 1.2–2.2)
ALP SERPL-CCNC: 74 IU/L (ref 44–121)
ALT SERPL-CCNC: 10 IU/L (ref 0–32)
AST SERPL-CCNC: 12 IU/L (ref 0–40)
BASOPHILS # BLD AUTO: 0 X10E3/UL (ref 0–0.2)
BASOPHILS NFR BLD AUTO: 0 %
BILIRUB SERPL-MCNC: 0.3 MG/DL (ref 0–1.2)
BUN SERPL-MCNC: 12 MG/DL (ref 6–24)
BUN/CREAT SERPL: 17 (ref 9–23)
CALCIUM SERPL-MCNC: 9.2 MG/DL (ref 8.7–10.2)
CHLORIDE SERPL-SCNC: 103 MMOL/L (ref 96–106)
CHOLEST SERPL-MCNC: 198 MG/DL (ref 100–199)
CO2 SERPL-SCNC: 21 MMOL/L (ref 20–29)
CREAT SERPL-MCNC: 0.71 MG/DL (ref 0.57–1)
EGFRCR SERPLBLD CKD-EPI 2021: 108 ML/MIN/1.73
EOSINOPHIL # BLD AUTO: 0 X10E3/UL (ref 0–0.4)
EOSINOPHIL NFR BLD AUTO: 0 %
ERYTHROCYTE [DISTWIDTH] IN BLOOD BY AUTOMATED COUNT: 12.2 % (ref 11.7–15.4)
GLOBULIN SER CALC-MCNC: 2.4 G/DL (ref 1.5–4.5)
GLUCOSE SERPL-MCNC: 88 MG/DL (ref 70–99)
HCT VFR BLD AUTO: 43 % (ref 34–46.6)
HCV IGG SERPL QL IA: NON REACTIVE
HDLC SERPL-MCNC: 56 MG/DL
HGB BLD-MCNC: 14.4 G/DL (ref 11.1–15.9)
IMM GRANULOCYTES # BLD AUTO: 0 X10E3/UL (ref 0–0.1)
IMM GRANULOCYTES NFR BLD AUTO: 0 %
LDLC SERPL CALC-MCNC: 116 MG/DL (ref 0–99)
LYMPHOCYTES # BLD AUTO: 4 X10E3/UL (ref 0.7–3.1)
LYMPHOCYTES NFR BLD AUTO: 24 %
MAGNESIUM SERPL-MCNC: 2.1 MG/DL (ref 1.6–2.3)
MCH RBC QN AUTO: 30.6 PG (ref 26.6–33)
MCHC RBC AUTO-ENTMCNC: 33.5 G/DL (ref 31.5–35.7)
MCV RBC AUTO: 92 FL (ref 79–97)
MONOCYTES # BLD AUTO: 1.1 X10E3/UL (ref 0.1–0.9)
MONOCYTES NFR BLD AUTO: 7 %
NEUTROPHILS # BLD AUTO: 11.2 X10E3/UL (ref 1.4–7)
NEUTROPHILS NFR BLD AUTO: 69 %
PLATELET # BLD AUTO: 400 X10E3/UL (ref 150–450)
POTASSIUM SERPL-SCNC: 4.3 MMOL/L (ref 3.5–5.2)
PROT SERPL-MCNC: 7 G/DL (ref 6–8.5)
RBC # BLD AUTO: 4.7 X10E6/UL (ref 3.77–5.28)
SODIUM SERPL-SCNC: 140 MMOL/L (ref 134–144)
TRIGL SERPL-MCNC: 150 MG/DL (ref 0–149)
TSH SERPL DL<=0.005 MIU/L-ACNC: 1.15 UIU/ML (ref 0.45–4.5)
VLDLC SERPL CALC-MCNC: 26 MG/DL (ref 5–40)
WBC # BLD AUTO: 16.4 X10E3/UL (ref 3.4–10.8)

## 2024-03-04 LAB
BACTERIA UR CULT: ABNORMAL
BACTERIA UR CULT: ABNORMAL
OTHER ANTIBIOTIC SUSC ISLT: ABNORMAL

## 2024-03-06 NOTE — PROGRESS NOTES
"                                                                 [facility]     Office Visit       Date: 03/07/2024   Patient Name: Samina Maya  MRN: [id number]  YOB: 1980    Referring Physician: No ref. provider found     Chief Complaint:   Chief Complaint   Patient presents with    Follow-up     EMG Bilateral upper extremities 3/1/24     History of Present Illness:   Samina Maya is a 43 y.o. female right-hand-dominant presenting to clinic for follow-up of EMG studies.  She continues to endorse left greater than right hand numbness and tingling.  She reports this involves mostly the thumb index and long fingers to bilateral hands and occasionally the small finger of the right hand.  This occurs at night causing her to awaken and shake out her hands.  She has intermittently been using braces with no significant improvements.  No prior injections.  No other complaints or concerns.    No personal history of diabetes.    Subjective   Review of Systems:   Review of Systems   Pertinent review of systems per HPI    I reviewed the patient's chief complaint, history of present illness, review of systems, past medical history, surgical history, family history, social history, medications and allergy list in the EMR on 03/07/2024 and agree with the findings above.    Objective    Quality Measures:   ACP:   ACP discussion was declined by the patient.      Tobacco:   Samina Maya  reports that she has been smoking cigarettes. She started smoking about 27 years ago. She has a 7 pack-year smoking history. She has never used smokeless tobacco..     Vital Signs:   Vitals:    03/07/24 1450   BP: 128/90   Weight: 55.5 kg (122 lb 5.7 oz)   Height: 165.1 cm (65\")     BMI: BMI is within normal parameters. No other follow-up for BMI required.     General: No acute distress. Alert and oriented.     Ortho Exam:  Examination of bilateral upper extremities demonstrates no deformity.  There are no skin " lesions or abrasions.  There is no thenar or hypothenar atrophy.  The A1 pulleys are nontender to palpation.  There is no catching or locking.  She is able make composite fist and oppose the thumb to small finger.  5/5 APB and FDI strength bilaterally.  Positive Tinel, Durkan's, Phalen's at bilateral wrist.  Negative Tinel's at bilateral elbows today.  The right ulnar nerve is nontender on exam today.  Sensation is decreased throughout the median nerve distributions bilaterally and intact to light touch throughout the ulnar nerves.  Warm and well-perfused distally.    Imaging / Studies:    Imaging Results (Last 24 Hours)       ** No results found for the last 24 hours. **        EMG/NCS performed on 3/1/2024 demonstrated bilateral moderate carpal tunnel syndrome. No electrodiagnostic evidence of cubital tunnel syndrome.     Assessment / Plan    Assessment/Plan:   Samina Maya is a 43 y.o. female with bilateral carpal tunnel syndrome     I reviewed with the patient her EMG findings which demonstrate bilateral moderate carpal tunnel syndrome.  She has no electrodiagnostic evidence of cubital tunnel syndrome, and she feels that the numbness and tingling to her right small finger is intermittent.  She feels that her left hand is more symptomatic and she has failed to improve with bracing.  I offered her an injection, however she declined and was most interested in definitive treatment.  I think this is a reasonable plan.  I discussed with her the risks, benefits, alternatives and prognosis and she elects to proceed with left open carpal tunnel release.    The risks and benefits of the procedure were discussed with the patient and/or appropriate guardian, which include but are not limited to: the risk of bleeding, pain, infection, wound complications, neurovascular damage, post-operative stiffness, tendon and/or ligament injury, persistent pain, need for additional surgeries in the future, and general risks from  anesthesia. Carpal tunnel specific risks include: persistent numbness and tingling as the goal of surgery is to prevent further worsening of symptoms, damage to the median nerve and its branches, persistent weakness and pillar pain. We also discussed the post-operative rehabilitation, length of immobilization, the need for therapy, and the overall expected outcomes from the procedure. Proper time was given to answer the patient's questions regarding the procedure. The patient expressed understanding. Knowing what the risks are and what the conservative treatment is, the patient elected to forgo any further conservative treatment options and proceed with the surgical intervention. Surgical consent was obtained in the clinic and signed by myself and the patient. She will follow up with me postoperatively.       ICD-10-CM ICD-9-CM   1. Bilateral carpal tunnel syndrome  G56.03 354.0     Follow Up:   Return for Follow Up- After surgery.      Jennifer Perez MD  [facility]

## 2024-03-07 ENCOUNTER — OFFICE VISIT (OUTPATIENT)
Dept: ORTHOPEDIC SURGERY | Facility: CLINIC | Age: 44
End: 2024-03-07
Payer: COMMERCIAL

## 2024-03-07 VITALS
BODY MASS INDEX: 20.39 KG/M2 | HEIGHT: 65 IN | SYSTOLIC BLOOD PRESSURE: 128 MMHG | WEIGHT: 122.36 LBS | DIASTOLIC BLOOD PRESSURE: 90 MMHG

## 2024-03-07 DIAGNOSIS — G56.03 BILATERAL CARPAL TUNNEL SYNDROME: Primary | ICD-10-CM

## 2024-03-07 DIAGNOSIS — D72.829 LEUKOCYTOSIS, UNSPECIFIED TYPE: Primary | ICD-10-CM

## 2024-03-08 ENCOUNTER — TELEPHONE (OUTPATIENT)
Dept: ORTHOPEDIC SURGERY | Facility: CLINIC | Age: 44
End: 2024-03-08
Payer: COMMERCIAL

## 2024-03-08 NOTE — TELEPHONE ENCOUNTER
Caller: BENJI    Relationship to patient: SELF    Best call back number: [phone]    Chief complaint: LEFT WRIST    Type of visit: SURGERY    Requested date: ASAP

## 2024-03-12 ENCOUNTER — OFFICE VISIT (OUTPATIENT)
Dept: CARDIOLOGY | Facility: CLINIC | Age: 44
End: 2024-03-12
Payer: COMMERCIAL

## 2024-03-12 VITALS
RESPIRATION RATE: 18 BRPM | DIASTOLIC BLOOD PRESSURE: 82 MMHG | BODY MASS INDEX: 20.83 KG/M2 | HEIGHT: 65 IN | OXYGEN SATURATION: 98 % | SYSTOLIC BLOOD PRESSURE: 144 MMHG | WEIGHT: 125 LBS | HEART RATE: 94 BPM

## 2024-03-12 DIAGNOSIS — I25.10 CORONARY ARTERY DISEASE INVOLVING NATIVE CORONARY ARTERY OF NATIVE HEART WITHOUT ANGINA PECTORIS: Primary | ICD-10-CM

## 2024-03-12 DIAGNOSIS — I10 ESSENTIAL HYPERTENSION: ICD-10-CM

## 2024-03-12 DIAGNOSIS — Z72.0 TOBACCO ABUSE: ICD-10-CM

## 2024-03-12 DIAGNOSIS — E78.2 MIXED HYPERLIPIDEMIA: ICD-10-CM

## 2024-03-12 DIAGNOSIS — I25.2 HISTORY OF MI (MYOCARDIAL INFARCTION): ICD-10-CM

## 2024-03-12 PROCEDURE — 1159F MED LIST DOCD IN RCRD: CPT | Performed by: INTERNAL MEDICINE

## 2024-03-12 PROCEDURE — 93000 ELECTROCARDIOGRAM COMPLETE: CPT | Performed by: INTERNAL MEDICINE

## 2024-03-12 PROCEDURE — 1160F RVW MEDS BY RX/DR IN RCRD: CPT | Performed by: INTERNAL MEDICINE

## 2024-03-12 PROCEDURE — 3079F DIAST BP 80-89 MM HG: CPT | Performed by: INTERNAL MEDICINE

## 2024-03-12 PROCEDURE — 99204 OFFICE O/P NEW MOD 45 MIN: CPT | Performed by: INTERNAL MEDICINE

## 2024-03-12 PROCEDURE — 3077F SYST BP >= 140 MM HG: CPT | Performed by: INTERNAL MEDICINE

## 2024-03-12 RX ORDER — ASPIRIN 81 MG/1
81 TABLET ORAL DAILY
Qty: 90 TABLET | Refills: 3 | Status: SHIPPED | OUTPATIENT
Start: 2024-03-12

## 2024-03-12 RX ORDER — ATORVASTATIN CALCIUM 40 MG/1
40 TABLET, FILM COATED ORAL NIGHTLY
Qty: 90 TABLET | Refills: 2 | Status: SHIPPED | OUTPATIENT
Start: 2024-03-12

## 2024-03-12 RX ORDER — EZETIMIBE 10 MG/1
10 TABLET ORAL DAILY
Qty: 90 TABLET | Refills: 2 | Status: SHIPPED | OUTPATIENT
Start: 2024-03-12

## 2024-03-12 RX ORDER — VALSARTAN 80 MG/1
80 TABLET ORAL DAILY
Qty: 90 TABLET | Refills: 2 | Status: SHIPPED | OUTPATIENT
Start: 2024-03-12

## 2024-03-12 NOTE — PROGRESS NOTES
``[facility]  [facility] CARDIOLOGY  [address] [place] [address]  Dept: [phone]  Dept Fax: [phone]    Samina Maay  1980    New Patient Office Note    History of Present Illness:  Samina Maya is a 43 y.o. female who presents to the clinic for Follow-up.CAD- She is 43 years old with CAD s/p MI in 2018 with stets to RCA and also has 50% LAD., her Ef was normal, she just moved from [facility], she has hypertension, hyperlipidemia and still smoking but planning to quit. Her LDL is 124, on Lipitor 40 mg although has not been taking daily, her cardiac exam is normal BP is 150.80, her EKG sinus HR 78, she is planning to have surgery for carpal tunnel syndrome and is here to establish care,  will restart Asa 81 mg, will keep Metoprolol 25 bid and add valsartan 80 mg will also keep lipitor 40 mg and add zetia,  will see her back in 4 months with a Lipid, will check also LPA and Apo b next visit. She is a low risk for any surgical procedure    The following portions of the patient's history were reviewed and updated as appropriate: allergies, current medications, past family history, past medical history, past social history, past surgical history, and problem list.    Medications:  aspirin  atorvastatin  busPIRone  ezetimibe  FLUoxetine  metoprolol tartrate  pantoprazole  valsartan    Subjective  Allergies   Allergen Reactions    Lisinopril Swelling    Morphine Nausea And Vomiting     Only pill form    Doxycycline Nausea And Vomiting    Aspirin Nausea And Vomiting    Duloxetine Unknown - Low Severity     Skin high sensitive; felt like skin was crawling    Hydrocodone Nausea And Vomiting     HA    Methadone Nausea And Vomiting    Pregabalin Swelling        Past Medical History:   Diagnosis Date    Arthritis of back 2013    CTS (carpal tunnel syndrome) 2018    Dislocated elbow 2018    Fracture of ankle 2023    Fracture, foot 2009 & 2023    Right then left    Hip  "arthrosis 2013    Knee swelling 2022    Low back strain 2009    Lumbosacral disc disease 2009    Scoliosis 2009    Tennis elbow 2018       Past Surgical History:   Procedure Laterality Date    BACK SURGERY  2009    TRIGGER POINT INJECTION  2023       Family History   Problem Relation Age of Onset    Cancer Mother     Diabetes Father     Severe sprains Father     Cancer Paternal Grandmother         Social History     Socioeconomic History    Marital status: Single   Tobacco Use    Smoking status: Every Day     Current packs/day: 0.25     Average packs/day: 0.3 packs/day for 27.8 years (7.0 ttl pk-yrs)     Types: Cigarettes     Start date: 5/19/1996    Smokeless tobacco: Never   Vaping Use    Vaping status: Never Used   Substance and Sexual Activity    Alcohol use: Never    Drug use: Yes     Types: Marijuana    Sexual activity: Yes     Partners: Male     Birth control/protection: Depo-provera       Review of Systems   Constitutional: Negative.    HENT: Negative.     Respiratory: Negative.     Cardiovascular: Negative.    Endocrine: Negative.    Genitourinary: Negative.    Musculoskeletal: Negative.    Skin: Negative.    Allergic/Immunologic: Negative.    Neurological: Negative.    Hematological: Negative.    Psychiatric/Behavioral: Negative.         Cardiovascular Procedures    ECHO/MUGA:  STRESS TESTS:   CARDIAC CATH:   DEVICES:   HOLTER:   CT/MRI:   VASCULAR:   CARDIOTHORACIC:     Objective  Vitals:    03/12/24 1447   BP: 144/82   BP Location: Right arm   Patient Position: Lying   Cuff Size: Adult   Pulse: 94   Resp: 18   SpO2: 98%   Weight: 56.7 kg (125 lb)   Height: 165.1 cm (65\")   PainSc: 0-No pain       Physical Exam  Vitals reviewed.   Constitutional:       Appearance: Healthy appearance. Not in distress.   Neck:      Vascular: No JVR. JVD normal.   Pulmonary:      Effort: Pulmonary effort is normal.      Breath sounds: Normal breath sounds. No wheezing. No rhonchi. No rales.   Chest:      Chest wall: Not " tender to palpatation.   Cardiovascular:      PMI at left midclavicular line. Normal rate. Regular rhythm. Normal S1. Normal S2.       Murmurs: There is no murmur.      No gallop.  No click. No rub.   Pulses:     Intact distal pulses.   Edema:     Peripheral edema absent.   Abdominal:      General: Bowel sounds are normal.      Palpations: Abdomen is soft.      Tenderness: There is no abdominal tenderness.   Musculoskeletal: Normal range of motion.         General: No tenderness. Skin:     General: Skin is warm and dry.   Neurological:      General: No focal deficit present.      Mental Status: Alert and oriented to person, place and time.        Diagnostic Data    ECG 12 Lead    Date/Time: 3/12/2024 3:30 PM  Performed by: Priyank Day MD    Authorized by: Priyank Day MD  Comparison: compared with previous ECG from 9/21/2023  Similar to previous ECG  Rhythm: sinus rhythm  Rate: normal  BPM: 78  QRS axis: normal  Other findings: right atrial abnormality    Clinical impression: abnormal EKG        Assessment and Plan  Diagnoses and all orders for this visit:    Coronary artery disease involving native coronary artery of native heart without angina pectoris- No chest pain, doing good, will keep ASA 81 mg and Metoprolol 25 bid    Mixed hyperlipidemia- LDL over 120 on Lipitor 40 mg, will add Zetia 10mg, she has not been taking constant     History of MI (myocardial infarction)- as above nSTEMI in 2018 , stent to RCA, has also LAD 50%    Tobacco abuse- she is planning to quit    Other orders  -     aspirin 81 MG EC tablet; Take 1 tablet by mouth Daily.  -     ezetimibe (ZETIA) 10 MG tablet; Take 1 tablet by mouth Daily.  -     atorvastatin (LIPITOR) 40 MG tablet; Take 1 tablet by mouth Every Night.  -     metoprolol tartrate (LOPRESSOR) 25 MG tablet; Take 1 tablet by mouth 2 (Two) Times a Day.  -     valsartan (DIOVAN) 80 MG tablet; Take 1 tablet by mouth Daily.        Return in about 4 months (around  7/12/2024).    Priyank Day MD  03/12/2024

## 2024-03-20 ENCOUNTER — DOCUMENTATION (OUTPATIENT)
Age: 44
End: 2024-03-20
Payer: COMMERCIAL

## 2024-03-20 ENCOUNTER — OUTSIDE FACILITY SERVICE (OUTPATIENT)
Dept: ORTHOPEDIC SURGERY | Facility: CLINIC | Age: 44
End: 2024-03-20
Payer: COMMERCIAL

## 2024-03-20 PROCEDURE — 64721 CARPAL TUNNEL SURGERY: CPT | Performed by: STUDENT IN AN ORGANIZED HEALTH CARE EDUCATION/TRAINING PROGRAM

## 2024-03-20 NOTE — PROGRESS NOTES
ORTHOPEDIC OPERATIVE REPORT    PATIENT NAME: Samina Maya     MRN: [id number]    LOCATION: [facility]    DATE OF SURGERY: 03/20/24    PREOPERATIVE DIAGNOSIS:   Left Carpal Tunnel Syndrome    POSTOPERATIVE DIAGNOSIS:  Left Carpal Tunnel Syndrome     PROCEDURE PERFORMED:  Left Carpal Tunnel Release    CPT CODE:  29480    SURGEON: Jennifer Perez MD     ASSISTANT: None     ANESTHESIA: 7cc total of  a 50:50 mixture of 0.5% Marcaine mixed with 1% lidocaine with 1:100,000 epinephrine    SPECIMENS: None    TOURNIQUET TIME: 10 minutes    ESTIMATED BLOOD LOSS: Minimal    COMPLICATIONS: None    IMPLANTS: None    INDICATIONS FOR PROCEDURE:  Samina Maya is a 43 year old female who presented to clinic with complaints of numbness and tingling to the bilateral hands, left greater than right, involving predominantly the thumb, index, and long fingers. She reports occasional numbness to the right small finger. Clinical examination was consistent with carpal tunnel syndrome. Electrodiagnostic studies were obtained. These demonstrated moderate bilateral carpal tunnel syndrome without evidence of cubital tunnel syndrome. The patient was offered conservative treatment including nerve gliding exercises, wrist braces, and corticosteroid injection. Despite treatment with bracing, they continued to endorse persistent numbness and tingling. She did not wish to attempt corticosteroid injection and was most interested in definitive treatment. Therefore, they were offered an open carpal tunnel release. After discussion of the risks, benefits, alternatives and prognosis, they elected to proceed with left open carpal tunnel release.    DESCRIPTION OF PROCEDURE:   The patient was identified in the preoperative holding area utilizing two patient identifiers. The operative extremity was marked. A preoperative block was performed using local anesthetic consisting of a 50:50 mixture of 0.5% Marcaine mixed with 1% lidocaine with 1:100,000 epinephrine. A  total of 7cc was used and injected proximally to block the palmar cutaneous nerve branch and then superficially along the planned carpal tunnel incision. They were taken back to the operating room and placed supine on the operative table.  A forearm tourniquet was placed and the operative extremity was prepped and draped in a standard sterile fashion. A surgical time out was performed that confirmed the correct site, procedure and laterality. No preoperative antibiotics were indicated.     An incision was drawn out in line with the radial border of the ring finger starting just proximal to Resendiz's cardinal line and ending 0.5 cm distal to the distal wrist crease. An esmarch was used to exsanguinate the limb, and the tourniquet was inflated to 250 mmHg.  A 15 blade was used to incise through the skin and subcutaneous tissue. Littler scissors were used to bluntly dissect down to the palmar fascia. A self-retaining blunt retractor was placed, exposing the palmar fascia. A 15 blade was used to incise through the palmar fascia in line with the skin incision.  The ulnar based fat was encountered and elevated from radial to ulnar allowing the self-retaining retractor to be placed deeper, retracting the fat from the surgical field. A small palmaris brevis was encountered. The transverse carpal ligament as exposed. A 15 blade was used to incise through the transverse carpal ligament into the carpal tunnel under direct visualization. A combination of scissors and a 15 blade were used to complete the dissection distally until fat was encountered from the superficial palmar arch. Once the distal dissection was confirmed completed, the retractor was reversed and attention was turned proximally. A scalpel was used to incise the transverse carpal ligament proximally under direct visualization. A freer elevator was used to release adhesions from superficial and deep to the transverse carpal ligament proximally. Scissors were used  to complete the proximal dissection releasing up to the level of the antebrachial fascia. Compression of the median nerve was noted at the proximal wrist crease. There was no evidence of a transligamentous recurrent motor branch.     Once the transverse carpal ligament was fully released, a visual inspection of the carpal tunnel demonstrated a complete release  without evidence additional pathology. The tourniquet was deflated and hemostasis was achieved at the wound edges. The wound was irrigated with normal saline and closed with 4-0 nylon. 2x2 gauze, xeroform and an ace wrap were placed. All counts were correct at the end of the case.     POSTOPERATIVE PLAN:  No lifting greater than 5 pounds with the operative extremity.  2.  Over the counter Tylenol and/or Advil/Aleve/Motrin for pain control.   3.  Dressings may be removed in 5 days and replaced with a dry daily dressing.  4.  Follow up in 10-14 days as scheduled.    Jennifer Perez MD  [facility] [place]

## 2024-03-27 RX ORDER — BUSPIRONE HYDROCHLORIDE 30 MG/1
30 TABLET ORAL 2 TIMES DAILY
Qty: 30 TABLET | Refills: 0 | Status: SHIPPED | OUTPATIENT
Start: 2024-03-27

## 2024-03-27 RX ORDER — PANTOPRAZOLE SODIUM 20 MG/1
TABLET, DELAYED RELEASE ORAL
Status: CANCELLED | OUTPATIENT
Start: 2024-03-27

## 2024-03-27 RX ORDER — PANTOPRAZOLE SODIUM 20 MG/1
20 TABLET, DELAYED RELEASE ORAL DAILY
COMMUNITY

## 2024-03-27 RX ORDER — PANTOPRAZOLE SODIUM 20 MG/1
20 TABLET, DELAYED RELEASE ORAL 2 TIMES DAILY
Qty: 30 TABLET | Refills: 0 | Status: SHIPPED | OUTPATIENT
Start: 2024-03-27

## 2024-03-27 RX ORDER — PANTOPRAZOLE SODIUM 20 MG/1
20 TABLET, DELAYED RELEASE ORAL 2 TIMES DAILY
COMMUNITY
End: 2024-03-27 | Stop reason: SDUPTHER

## 2024-03-31 NOTE — PROGRESS NOTES
[facility]     Post Operative Office Visit      Date: 04/01/2024   Patient Name: Samina Maya  MRN: [id number]  YOB: 1980    Chief Complaint:   Chief Complaint   Patient presents with    Post-op     1.5 week s/p left carpal tunnel release 3/20/24     History of Present Illness:   Samina Maya is a 43 y.o. female status post left carpal tunnel release, DOS 3/20/2024.  Patient been doing well since surgery.  She reports 80% improvement in the numbness and tingling in her hands.  She had no wound complications with the exception for a couple nights ago she was clapping in her sleep and noted increased pain redness and slight drainage to the proximal portion of her incision.  This has not recurred.  Otherwise no other complaints or concerns.    Subjective   Review of Systems:   Review of Systems     I reviewed the patient's chief complaint, history of present illness, review of systems, past medical history, surgical history, family history, social history, medications and allergy list in the EMR on 04/01/2024 and agree with the findings above.    Objective    Vital Signs: There were no vitals filed for this visit.    General Appearance: No acute distress. Alert and oriented.     Ortho Exam:  Examination of the left upper extremity demonstrates a healing surgical incision over the carpal tunnel.  Sutures are in place without evidence of erythema, warmth, drainage or dehiscence.  Sutures removed today in clinic and tolerated well.  No evidence of superficial wound dehiscence after suture removal.  She is able to make a composite fist and oppose thumb to small finger.  Sensation is grossly intact light touch throughout the median and ulnar nerve distribution of the hand.  Warm well-perfused distally.    Imaging / Studies:    Imaging Results (Last 24 Hours)       ** No results found for the last 24 hours. **        EMG nerve  conduction studies demonstrate bilateral moderate carpal tunnel syndrome.    Assessment / Plan    Assessment/Plan:   Samina Maya is a 43 y.o. female status post left carpal tunnel release, DOS 3/20/2024.    The patient has done well since surgery.  She has had significant improvements in the numbness and tingling to her left hand.  Sutures were removed today in clinic and tolerated well.  I instructed the patient on keeping the incision clean and dry.  She may begin gentle scar massage.  I advised her on no ointments or hydrogen peroxide.  The patient expressed understanding.  I will see her back in 4 weeks for reevaluation.  May discuss right carpal tunnel release at that time.  All question concerns were addressed.  She was agreeable to plan.      ICD-10-CM ICD-9-CM   1. Bilateral carpal tunnel syndrome  G56.03 354.0     Follow Up:   Return in about 4 weeks (around 4/29/2024) for Follow Up.      Jennifer Perez MD  [facility] [place] [facility]

## 2024-04-01 ENCOUNTER — OFFICE VISIT (OUTPATIENT)
Dept: ORTHOPEDIC SURGERY | Facility: CLINIC | Age: 44
End: 2024-04-01
Payer: COMMERCIAL

## 2024-04-01 VITALS — TEMPERATURE: 91 F

## 2024-04-01 DIAGNOSIS — G56.03 BILATERAL CARPAL TUNNEL SYNDROME: Primary | ICD-10-CM

## 2024-04-01 PROCEDURE — 99024 POSTOP FOLLOW-UP VISIT: CPT | Performed by: STUDENT IN AN ORGANIZED HEALTH CARE EDUCATION/TRAINING PROGRAM

## 2024-04-23 RX ORDER — FLUOXETINE HYDROCHLORIDE 20 MG/1
60 CAPSULE ORAL DAILY
Qty: 90 CAPSULE | Refills: 2 | Status: SHIPPED | OUTPATIENT
Start: 2024-04-23

## 2024-04-23 RX ORDER — PANTOPRAZOLE SODIUM 20 MG/1
20 TABLET, DELAYED RELEASE ORAL DAILY
Qty: 30 TABLET | Refills: 2 | Status: SHIPPED | OUTPATIENT
Start: 2024-04-23

## 2024-04-29 ENCOUNTER — OFFICE VISIT (OUTPATIENT)
Dept: ORTHOPEDIC SURGERY | Facility: CLINIC | Age: 44
End: 2024-04-29
Payer: COMMERCIAL

## 2024-04-29 DIAGNOSIS — M18.10 ARTHRITIS OF CARPOMETACARPAL (CMC) JOINT OF THUMB: ICD-10-CM

## 2024-04-29 DIAGNOSIS — G56.03 BILATERAL CARPAL TUNNEL SYNDROME: Primary | ICD-10-CM

## 2024-04-29 PROCEDURE — 1160F RVW MEDS BY RX/DR IN RCRD: CPT | Performed by: STUDENT IN AN ORGANIZED HEALTH CARE EDUCATION/TRAINING PROGRAM

## 2024-04-29 PROCEDURE — 99024 POSTOP FOLLOW-UP VISIT: CPT | Performed by: STUDENT IN AN ORGANIZED HEALTH CARE EDUCATION/TRAINING PROGRAM

## 2024-04-29 PROCEDURE — 1159F MED LIST DOCD IN RCRD: CPT | Performed by: STUDENT IN AN ORGANIZED HEALTH CARE EDUCATION/TRAINING PROGRAM

## 2024-04-29 NOTE — PROGRESS NOTES
[facility]     Post Operative Office Visit      Date: 04/29/2024   Patient Name: Samina Maya  MRN: [id number]  YOB: 1980    Chief Complaint:   Chief Complaint   Patient presents with    Post-op     3 week f/u -- 5 weeks s/p left carpal tunnel release 3/20/24     History of Present Illness:   Samina Maya is a 43 y.o. female status post left carpal tunnel release, DOS 3/20/2024.  Patient is been doing well since surgery.  She reports improvements in her pain, numbness, and tingling to the left hand.  She continues to endorse some tenderness around the incision.  She however is most interested in discussing right carpal tunnel release today.  She continues to endorse numbness and tingling throughout the right hand that she feels affects all the digits.  This is worse at night causing her to awaken and shake out her hands.  She notices this throughout the day.  This been unresponsive to splinting.  No other complaints or concerns.    Subjective   Review of Systems:   Review of Systems   Constitutional:  Negative for chills, fever, unexpected weight gain and unexpected weight loss.   HENT:  Negative for congestion, postnasal drip and rhinorrhea.    Eyes:  Negative for blurred vision.   Respiratory:  Negative for shortness of breath.    Cardiovascular:  Negative for leg swelling.   Gastrointestinal:  Negative for abdominal pain, nausea and vomiting.   Genitourinary:  Negative for difficulty urinating.   Musculoskeletal:  Positive for arthralgias. Negative for gait problem, joint swelling and myalgias.   Skin:  Negative for skin lesions and wound.   Neurological:  Negative for dizziness, weakness, light-headedness and numbness.   Hematological:  Does not bruise/bleed easily.   Psychiatric/Behavioral:  Negative for depressed mood.         I reviewed the patient's chief complaint, history of present illness, review of systems,  past medical history, surgical history, family history, social history, medications and allergy list in the EMR on 04/29/2024 and agree with the findings above.    Objective    Vital Signs: There were no vitals filed for this visit.    General Appearance: No acute distress. Alert and oriented.     Ortho Exam:  Examination of the left upper extremity demonstrates a well-healed surgical incision overlying the carpal tunnel.  This is mildly tender palpation.  She is able to make composite fist and oppose thumb to small finger.  Sensation is intact to light touch throughout the median and ulnar nerve distribution of the hand.  Warm and well-perfused distally.    Examination of the right upper extremity demonstrates no deformity.  No skin lesions or abrasions.  No swelling or ecchymosis.  No atrophy.  She is able to make composite fist and oppose thumb to small finger.  Sensation is decreased throughout all digits of the hand.  5/5 APB and FDI strength.  Positive Tinel, Durkan's, Phalen's at the wrist.  Negative Tinel's at the elbow.  Warm and well-perfused distally.    Imaging / Studies:    Imaging Results (Last 24 Hours)       ** No results found for the last 24 hours. **        EMG nerve conduction studies demonstrate bilateral moderate carpal tunnel syndrome.     Assessment / Plan    Assessment/Plan:   Samina Maya is a 43 y.o. female status post left carpal tunnel release, DOS 3/20/2024, and right carpal tunnel syndrome.     The patient has done well after left carpal tunnel release and has had improvements in her overall pain, numbness, and tingling.  She is happy with the outcome regarding her left hand.  She is most interested in discussing right carpal tunnel release as her symptoms have failed to improve with bracing.  I discussed with her the risks, benefits, alternatives and prognosis, and she elects to proceed with right carpal tunnel release.    The risks and benefits of the procedure were discussed with  the patient and/or appropriate guardian, which include but are not limited to: the risk of bleeding, pain, infection, wound complications, neurovascular damage, post-operative stiffness, tendon and/or ligament injury, persistent pain, need for additional surgeries in the future, and general risks from anesthesia. Carpal tunnel specific risks include: persistent numbness and tingling as the goal of surgery is to prevent further worsening of symptoms, damage to the median nerve and its branches, persistent weakness and pillar pain. We also discussed the post-operative rehabilitation, length of immobilization, the need for therapy, and the overall expected outcomes from the procedure. Proper time was given to answer the patient's questions regarding the procedure. The patient expressed understanding. Knowing what the risks are and what the conservative treatment is, the patient elected to forgo any further conservative treatment options and proceed with the surgical intervention. Surgical consent was obtained in the clinic and signed by myself and the patient.  She will follow-up with me postoperatively.      ICD-10-CM ICD-9-CM   1. Bilateral carpal tunnel syndrome  G56.03 354.0   2. Arthritis of carpometacarpal (CMC) joint of thumb  M18.10 716.94     Follow Up:   Return for Follow Up- After surgery.      Jennifer Perez MD  [facility]

## 2024-04-30 PROBLEM — M18.10 ARTHRITIS OF CARPOMETACARPAL (CMC) JOINT OF THUMB: Status: ACTIVE | Noted: 2024-04-30

## 2024-05-02 ENCOUNTER — TELEPHONE (OUTPATIENT)
Age: 44
End: 2024-05-02
Payer: COMMERCIAL

## 2024-05-02 NOTE — TELEPHONE ENCOUNTER
I called patient to get her surgery scheduled with Dr. Perez, she did not answer at this time. I left a voicemail for her to give me a call back at her convenience at [phone]. Yocasta Tripathi CMA

## 2024-05-09 ENCOUNTER — OFFICE VISIT (OUTPATIENT)
Dept: GASTROENTEROLOGY | Facility: CLINIC | Age: 44
End: 2024-05-09
Payer: COMMERCIAL

## 2024-05-09 VITALS
OXYGEN SATURATION: 99 % | WEIGHT: 123 LBS | HEART RATE: 72 BPM | DIASTOLIC BLOOD PRESSURE: 70 MMHG | HEIGHT: 65 IN | SYSTOLIC BLOOD PRESSURE: 124 MMHG | BODY MASS INDEX: 20.49 KG/M2

## 2024-05-09 DIAGNOSIS — K21.9 GASTROESOPHAGEAL REFLUX DISEASE, UNSPECIFIED WHETHER ESOPHAGITIS PRESENT: Primary | ICD-10-CM

## 2024-05-09 DIAGNOSIS — Z12.11 ENCOUNTER FOR SCREENING COLONOSCOPY: ICD-10-CM

## 2024-05-09 DIAGNOSIS — K58.0 IRRITABLE BOWEL SYNDROME WITH DIARRHEA: ICD-10-CM

## 2024-05-09 RX ORDER — PANTOPRAZOLE SODIUM 20 MG/1
20 TABLET, DELAYED RELEASE ORAL DAILY
Qty: 90 TABLET | Refills: 3 | Status: SHIPPED | OUTPATIENT
Start: 2024-05-09

## 2024-05-15 ENCOUNTER — PRIOR AUTHORIZATION (OUTPATIENT)
Dept: GASTROENTEROLOGY | Facility: CLINIC | Age: 44
End: 2024-05-15
Payer: COMMERCIAL

## 2024-05-15 ENCOUNTER — DOCUMENTATION (OUTPATIENT)
Age: 44
End: 2024-05-15
Payer: COMMERCIAL

## 2024-05-15 ENCOUNTER — OUTSIDE FACILITY SERVICE (OUTPATIENT)
Dept: ORTHOPEDIC SURGERY | Facility: CLINIC | Age: 44
End: 2024-05-15
Payer: COMMERCIAL

## 2024-05-15 PROCEDURE — 64721 CARPAL TUNNEL SURGERY: CPT | Performed by: STUDENT IN AN ORGANIZED HEALTH CARE EDUCATION/TRAINING PROGRAM

## 2024-05-15 NOTE — PROGRESS NOTES
ORTHOPEDIC OPERATIVE REPORT    PATIENT NAME: Samina Maya     MRN: [id number]    LOCATION: [facility]    DATE OF SURGERY: 05/15/24    PREOPERATIVE DIAGNOSIS:   Right Carpal Tunnel Syndrome    POSTOPERATIVE DIAGNOSIS:  Right Carpal Tunnel Syndrome     PROCEDURE PERFORMED:  Right Carpal Tunnel Release    CPT CODE:  28094    SURGEON: Jennifer Perez MD     ASSISTANT: None     ANESTHESIA: 7cc total of  a 50:50 mixture of 0.5% Marcaine mixed with 1% lidocaine with 1:100,000 epinephrine    SPECIMENS: None    TOURNIQUET TIME: 10 minutes    ESTIMATED BLOOD LOSS: Minimal    COMPLICATIONS: None    IMPLANTS: None    INDICATIONS FOR PROCEDURE:  Samina Maya is a 44 year old female who presented to clinic with complaints of numbness and tingling bilateral hands involving predominantly the thumb, index, and long fingers. Clinical examination was consistent with carpal tunnel syndrome. Electrodiagnostic studies were obtained and demonstrated bilateral moderate carpal tunnel syndrome. The patient was offered conservative treatment including nerve gliding exercises, wrist braces, and corticosteroid injection. She underwent left carpal tunnel release on 3/20/2024 and has done well since surgery. Despite treatment with bracing and nerve glides, they continued to endorse persistent numbness and tingling. She was most interested in definitive treatment. Therefore, they were offered an open carpal tunnel release. After discussion of the risks, benefits, alternatives and prognosis, they elected to proceed with right open carpal tunnel release.    DESCRIPTION OF PROCEDURE:   The patient was identified in the preoperative holding area utilizing two patient identifiers. The operative extremity was marked. A preoperative block was performed using local anesthetic consisting of a 50:50 mixture of 0.5% Marcaine mixed with 1% lidocaine with 1:100,000 epinephrine. A total of 7cc was used and injected proximally to block the palmar cutaneous nerve  branch and then superficially along the planned carpal tunnel incision. They were taken back to the operating room and placed supine on the operative table.  A forearm tourniquet was placed and the operative extremity was prepped and draped in a standard sterile fashion. A surgical time out was performed that confirmed the correct site, procedure and laterality. No preoperative antibiotics were indicated.     An incision was drawn out in line with the radial border of the ring finger starting just proximal to Resendiz's cardinal line and ending 0.5 cm distal to the distal wrist crease. An esmarch was used to exsanguinate the limb, and the tourniquet was inflated to 250 mmHg.  A 15 blade was used to incise through the skin and subcutaneous tissue. Littler scissors were used to bluntly dissect down to the palmar fascia. A self-retaining blunt retractor was placed, exposing the palmar fascia. A 15 blade was used to incise through the palmar fascia in line with the skin incision.  The ulnar based fat was encountered and elevated from radial to ulnar allowing the self-retaining retractor to be placed deeper, retracting the fat from the surgical field. A palmaris brevis was not encountered. The transverse carpal ligament as exposed. A 15 blade was used to incise through the transverse carpal ligament into the carpal tunnel under direct visualization. A combination of scissors and a 15 blade were used to complete the dissection distally until fat was encountered from the superficial palmar arch. Thickening of the ligament was noted distally Once the distal dissection was confirmed completed, the retractor was reversed and attention was turned proximally. A scalpel was used to incise the transverse carpal ligament proximally under direct visualization. A freer elevator was used to release adhesions from superficial and deep to the transverse carpal ligament proximally. Scissors were used to complete the proximal dissection  releasing up to the level of the antebrachial fascia. There was no evidence of a transligamentous recurrent motor branch.     Once the transverse carpal ligament was fully released, a visual inspection of the carpal tunnel demonstrated a complete release without evidence additional pathology. The tourniquet was deflated and hemostasis was achieved at the wound edges. The wound was irrigated with normal saline and closed with 4-0 nylon. 2x2 gauze and an ace wrap were placed. All counts were correct at the end of the case.     POSTOPERATIVE PLAN:  No lifting greater than 5 pounds with the operative extremity.  2.  Over the counter Tylenol and/or Advil/Aleve/Motrin for pain control.   3.  Dressings may be removed in 5 days and replaced with a dry daily dressing.  4.  Follow up in 14 days as scheduled.    Jennifer Perez MD  [facility] [place] [facility]

## 2024-05-30 ENCOUNTER — TELEPHONE (OUTPATIENT)
Dept: ORTHOPEDIC SURGERY | Facility: CLINIC | Age: 44
End: 2024-05-30

## 2024-05-30 NOTE — TELEPHONE ENCOUNTER
Provider: JR    Caller: BENJI NOLASCO    Relationship to Patient: PATIENT    Pharmacy: NA    Phone Number: [phone] (home)       Reason for Call: PATIENT R/S TODAY'S POST OP TO 6.10.24; PATIENT ALSO STATES SHE REMOVED HER STITCHES TODAY    When was the patient last seen: 5.15.24 - SURGERY

## 2024-05-30 NOTE — TELEPHONE ENCOUNTER
Patient removal of suture is not advised. Would recommend return to clinic earlier if there are any issues. Thank you.

## 2024-06-10 RX ORDER — BUSPIRONE HYDROCHLORIDE 30 MG/1
30 TABLET ORAL 2 TIMES DAILY
Qty: 30 TABLET | Refills: 0 | Status: SHIPPED | OUTPATIENT
Start: 2024-06-10

## 2024-06-18 RX ORDER — SODIUM PICOSULFATE, MAGNESIUM OXIDE, AND ANHYDROUS CITRIC ACID 10; 3.5; 12 MG/160ML; G/160ML; G/160ML
350 LIQUID ORAL TAKE AS DIRECTED
Qty: 350 ML | Refills: 0 | Status: SHIPPED | OUTPATIENT
Start: 2024-06-18

## 2024-06-24 ENCOUNTER — PRIOR AUTHORIZATION (OUTPATIENT)
Dept: GASTROENTEROLOGY | Facility: CLINIC | Age: 44
End: 2024-06-24
Payer: COMMERCIAL

## 2024-06-24 RX ORDER — FLUOXETINE HYDROCHLORIDE 20 MG/1
60 CAPSULE ORAL DAILY
Qty: 90 CAPSULE | Refills: 0 | Status: SHIPPED | OUTPATIENT
Start: 2024-06-24

## 2024-06-24 NOTE — TELEPHONE ENCOUNTER
Additional Information Required  The brand name, Moviprep, is available without prior authorization.Please have the pharmacy process the claim for the brand Moviprep.If there is a clinical reason the patient cannot use the brand formulation (such as allergy to an ingredient found in the brand that is not in the generic) then please fax the appropriate PA request along with the confirming documentation directly to [facility] [employer] at [phone].

## 2024-07-01 RX ORDER — FLUOXETINE HYDROCHLORIDE 20 MG/1
60 CAPSULE ORAL DAILY
Qty: 90 CAPSULE | Refills: 0 | OUTPATIENT
Start: 2024-07-01

## 2024-07-05 RX ORDER — BUSPIRONE HYDROCHLORIDE 30 MG/1
30 TABLET ORAL 2 TIMES DAILY
Qty: 30 TABLET | Refills: 0 | Status: SHIPPED | OUTPATIENT
Start: 2024-07-05

## 2024-07-17 ENCOUNTER — OFFICE VISIT (OUTPATIENT)
Dept: FAMILY MEDICINE CLINIC | Facility: CLINIC | Age: 44
End: 2024-07-17
Payer: COMMERCIAL

## 2024-07-17 ENCOUNTER — TELEPHONE (OUTPATIENT)
Age: 44
End: 2024-07-17
Payer: COMMERCIAL

## 2024-07-17 VITALS
WEIGHT: 124.2 LBS | DIASTOLIC BLOOD PRESSURE: 76 MMHG | OXYGEN SATURATION: 98 % | BODY MASS INDEX: 19.96 KG/M2 | SYSTOLIC BLOOD PRESSURE: 118 MMHG | HEIGHT: 66 IN | HEART RATE: 86 BPM | TEMPERATURE: 98.1 F

## 2024-07-17 DIAGNOSIS — J01.00 ACUTE NON-RECURRENT MAXILLARY SINUSITIS: Primary | ICD-10-CM

## 2024-07-17 LAB
EXPIRATION DATE: ABNORMAL
EXPIRATION DATE: NORMAL
FLUAV AG UPPER RESP QL IA.RAPID: NOT DETECTED
FLUBV AG UPPER RESP QL IA.RAPID: NOT DETECTED
INTERNAL CONTROL: ABNORMAL
INTERNAL CONTROL: NORMAL
Lab: ABNORMAL
Lab: NORMAL
S PYO AG THROAT QL: NEGATIVE
SARS-COV-2 AG UPPER RESP QL IA.RAPID: DETECTED

## 2024-07-17 PROCEDURE — 87428 SARSCOV & INF VIR A&B AG IA: CPT | Performed by: NURSE PRACTITIONER

## 2024-07-17 PROCEDURE — 1126F AMNT PAIN NOTED NONE PRSNT: CPT | Performed by: NURSE PRACTITIONER

## 2024-07-17 PROCEDURE — 1159F MED LIST DOCD IN RCRD: CPT | Performed by: NURSE PRACTITIONER

## 2024-07-17 PROCEDURE — 1160F RVW MEDS BY RX/DR IN RCRD: CPT | Performed by: NURSE PRACTITIONER

## 2024-07-17 PROCEDURE — 87880 STREP A ASSAY W/OPTIC: CPT | Performed by: NURSE PRACTITIONER

## 2024-07-17 PROCEDURE — 99213 OFFICE O/P EST LOW 20 MIN: CPT | Performed by: NURSE PRACTITIONER

## 2024-07-17 PROCEDURE — 3074F SYST BP LT 130 MM HG: CPT | Performed by: NURSE PRACTITIONER

## 2024-07-17 PROCEDURE — 3078F DIAST BP <80 MM HG: CPT | Performed by: NURSE PRACTITIONER

## 2024-07-17 RX ORDER — CEFDINIR 300 MG/1
300 CAPSULE ORAL 2 TIMES DAILY
Qty: 20 CAPSULE | Refills: 0 | Status: SHIPPED | OUTPATIENT
Start: 2024-07-17

## 2024-07-17 NOTE — PROGRESS NOTES
"Chief Complaint  Cough (Sore throat since Friday )    Subjective          Samina Maya presents to [facility] PRIMARY [facility]  Cough  Associated symptoms include chills and a sore throat. Pertinent negatives include no chest pain, ear pain, rash, shortness of breath or wheezing.       Patient states for about the past week she has had cough congestion and a sore throat.  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  States she does have some body aches since she has been feeling better.  No GI issues.  No urinary issues.  No chest pain no chest pressure no shortness of breath no trouble breathing.  States she does have signs pressure congestion.    Objective   Vital Signs:   /76 (BP Location: Left arm, Patient Position: Sitting, Cuff Size: Adult)   Pulse 86   Temp 98.1 °F (36.7 °C)   Ht 167.6 cm (66\")   Wt 56.3 kg (124 lb 3.2 oz)   SpO2 98%   BMI 20.05 kg/m²     Body mass index is 20.05 kg/m².    Review of Systems   Constitutional:  Positive for chills.   HENT:  Positive for congestion, sinus pressure and sore throat. Negative for ear pain, sneezing, swollen glands and trouble swallowing.    Respiratory:  Positive for cough. Negative for shortness of breath and wheezing.    Cardiovascular:  Negative for chest pain and palpitations.   Gastrointestinal:  Negative for abdominal pain, constipation, diarrhea, nausea and vomiting.   Genitourinary:  Negative for dysuria and frequency.   Musculoskeletal:  Negative for back pain.   Skin:  Negative for rash.   Neurological:  Negative for headache.       Past History:  Medical History: has a past medical history of ADHD (attention deficit hyperactivity disorder) (2006), Allergic, Anxiety (2006), Arthritis of back (2013), Asthma (2018), CHF (congestive heart failure) (2018), Cholelithiasis, Coronary artery disease (2018), CTS (carpal tunnel syndrome) (2018), Depression (2006), Dislocated elbow (2018), " Fracture of ankle (2023), Fracture, foot (2009 & 2023), GERD (gastroesophageal reflux disease), Hip arthrosis (2013), Hyperlipidemia (2018), Hypertension (2018), Knee swelling (2022), Low back pain (2009), Low back strain (2009), Lumbosacral disc disease (2009), Myocardial infarction (2018), Osteopenia (2016), Scoliosis (2009), Tennis elbow (2018), and Urinary tract infection.   Surgical History: has a past surgical history that includes Back surgery (2009); Trigger point injection (2023); Carpal tunnel release (Left, 03/20/2024); Cholecystectomy (2013); Coronary stent placement (2018); and Spine surgery (2009).   Family History: family history includes Anxiety disorder in her maternal aunt and son; Arthritis in her father, maternal grandfather, maternal grandmother, and mother; COPD in her maternal grandfather; Cancer in her mother and paternal grandmother; Depression in her father, maternal aunt, and mother; Diabetes in her father; Heart disease in her maternal aunt and maternal grandmother; Hyperlipidemia in her maternal grandfather and mother; Other in her paternal grandmother; Severe sprains in her father; Stroke in her maternal aunt and maternal grandmother.   Social History: reports that she has been smoking cigarettes. She started smoking about 28 years ago. She has a 7 pack-year smoking history. She has never used smokeless tobacco. She reports current drug use. Drug: Marijuana. She reports that she does not drink alcohol.    PHQ-2 Depression Screening  Little interest or pleasure in doing things?     Feeling down, depressed, or hopeless?     PHQ-2 Total Score          PHQ-9 Depression Screening  Little interest or pleasure in doing things?     Feeling down, depressed, or hopeless?     Trouble falling or staying asleep, or sleeping too much?     Feeling tired or having little energy?     Poor appetite or overeating?     Feeling bad about yourself - or that you are a failure or have let yourself or your  family down?     Trouble concentrating on things, such as reading the newspaper or watching television?     Moving or speaking so slowly that other people could have noticed? Or the opposite - being so fidgety or restless that you have been moving around a lot more than usual?     Thoughts that you would be better off dead, or of hurting yourself in some way?     PHQ-9 Total Score     If you checked off any problems, how difficult have these problems made it for you to do your work, take care of things at home, or get along with other people?       PHQ-9 Total Score:        Patient screened positive for depression based on a PHQ-9 score of 0 on 2/19/2024. Follow-up recommendations include:          Current Outpatient Medications:     aspirin 81 MG EC tablet, Take 1 tablet by mouth Daily., Disp: 90 tablet, Rfl: 3    atorvastatin (LIPITOR) 40 MG tablet, Take 1 tablet by mouth Every Night., Disp: 90 tablet, Rfl: 2    busPIRone (BUSPAR) 30 MG tablet, Take 1 tablet by mouth 2 (Two) Times a Day. Take one tablet by mouth BID, Disp: 30 tablet, Rfl: 0    ezetimibe (ZETIA) 10 MG tablet, Take 1 tablet by mouth Daily., Disp: 90 tablet, Rfl: 2    FLUoxetine (PROzac) 20 MG capsule, TAKE THREE CAPSULES BY MOUTH DAILY, Disp: 90 capsule, Rfl: 0    metoprolol tartrate (LOPRESSOR) 25 MG tablet, Take 1 tablet by mouth 2 (Two) Times a Day., Disp: 180 tablet, Rfl: 2    pantoprazole (PROTONIX) 20 MG EC tablet, Take 1 tablet by mouth Daily., Disp: 90 tablet, Rfl: 3    riFAXIMin (XIFAXAN) 550 MG tablet, Take 1 tablet by mouth 3 (Three) Times a Day., Disp: 42 tablet, Rfl: 0    valsartan (DIOVAN) 80 MG tablet, Take 1 tablet by mouth Daily., Disp: 90 tablet, Rfl: 2    cefdinir (OMNICEF) 300 MG capsule, Take 1 capsule by mouth 2 (Two) Times a Day., Disp: 20 capsule, Rfl: 0    Sod Picosulfate-Mag Ox-Cit Acd (Clenpiq) 10-3.5-12 MG-GM -GM/160ML solution, Take 350 mL by mouth Take As Directed., Disp: 350 mL, Rfl: 0   (Not in a hospital admission)      Allergies: Lisinopril, Morphine, Doxycycline, Aspirin, Duloxetine, Hydrocodone, Methadone, and Pregabalin    Physical Exam  Constitutional:       Appearance: Normal appearance.   HENT:      Right Ear: Tympanic membrane, ear canal and external ear normal.      Left Ear: Tympanic membrane, ear canal and external ear normal.      Nose: Congestion present.      Comments: Maxillary sinuses tender bilaterally.     Mouth/Throat:      Mouth: Mucous membranes are moist.      Pharynx: Oropharynx is clear.   Cardiovascular:      Rate and Rhythm: Normal rate and regular rhythm.      Heart sounds: Normal heart sounds.   Pulmonary:      Effort: Pulmonary effort is normal. No respiratory distress.      Breath sounds: Normal breath sounds. No wheezing, rhonchi or rales.   Skin:     Findings: No rash.   Neurological:      General: No focal deficit present.      Mental Status: She is alert and oriented to person, place, and time. Mental status is at baseline.   Psychiatric:         Mood and Affect: Mood normal.         Behavior: Behavior normal.         Thought Content: Thought content normal.         Judgment: Judgment normal.          Result Review :                   Assessment and Plan    Diagnoses and all orders for this visit:    1. Acute non-recurrent maxillary sinusitis (Primary)  Assessment & Plan:  Covid positive. Denies covid medications and understands risks. flu strep all negative today in clinic.  Throat culture sent.  Call in 2 days for results.  Antibiotic as prescribed due to symptoms already being present for about a week and not improving.  Tylenol and ibuprofen as needed for pain fever.  May continue with allergy meds.  Mucinex DM as needed for cough and congestion.  Fluids and rest encouraged.  Patient states no risk or chance of pregnancy. Due to her working at [employer] recommend she be off work until her symptoms have noticeably improved. Return in 2 days if no improvement, sooner if worsens.  Return to  clinic or ED with any issues or concerns.    Orders:  -     Covid-19 + Flu A&B AG, Veritor; Future  -     POC Rapid Strep A; Future  -     cefdinir (OMNICEF) 300 MG capsule; Take 1 capsule by mouth 2 (Two) Times a Day.  Dispense: 20 capsule; Refill: 0              BMI is within normal parameters. No other follow-up for BMI required.       Follow Up   Return if symptoms worsen or fail to improve.  Patient was given instructions and counseling regarding her condition or for health maintenance advice. Please see specific information pulled into the AVS if appropriate.     MERYL Israel

## 2024-07-17 NOTE — TELEPHONE ENCOUNTER
Called to reschedule her 7/1/24 post op appointment per Dr Perez. LM to call back to be rescheduled.    HUB ok to reschedule.

## 2024-07-17 NOTE — ASSESSMENT & PLAN NOTE
Covid positive. Denies covid medications and understands risks. flu strep all negative today in clinic.  Throat culture sent.  Call in 2 days for results.  Antibiotic as prescribed due to symptoms already being present for about a week and not improving.  Tylenol and ibuprofen as needed for pain fever.  May continue with allergy meds.  Mucinex DM as needed for cough and congestion.  Fluids and rest encouraged.  Patient states no risk or chance of pregnancy. Due to her working at [employer] recommend she be off work until her symptoms have noticeably improved. Return in 2 days if no improvement, sooner if worsens.  Return to clinic or ED with any issues or concerns.

## 2024-07-18 ENCOUNTER — TELEPHONE (OUTPATIENT)
Age: 44
End: 2024-07-18
Payer: COMMERCIAL

## 2024-07-23 RX ORDER — FLUOXETINE HYDROCHLORIDE 20 MG/1
60 CAPSULE ORAL DAILY
Qty: 90 CAPSULE | Refills: 0 | Status: SHIPPED | OUTPATIENT
Start: 2024-07-23

## 2024-07-24 ENCOUNTER — TELEPHONE (OUTPATIENT)
Age: 44
End: 2024-07-24
Payer: COMMERCIAL

## 2024-08-22 RX ORDER — SODIUM PICOSULFATE, MAGNESIUM OXIDE, AND ANHYDROUS CITRIC ACID 10; 3.5; 12 MG/160ML; G/160ML; G/160ML
350 LIQUID ORAL TAKE AS DIRECTED
Qty: 350 ML | Refills: 0 | Status: SHIPPED | OUTPATIENT
Start: 2024-08-22

## 2024-08-26 ENCOUNTER — PRIOR AUTHORIZATION (OUTPATIENT)
Dept: GASTROENTEROLOGY | Facility: CLINIC | Age: 44
End: 2024-08-26
Payer: COMMERCIAL

## 2024-08-26 NOTE — TELEPHONE ENCOUNTER
The request has been approved. The authorization is effective from 08/26/2024 to 08/26/2025, as long as the member is enrolled in their current health plan. A written notification letter will follow with additional details.

## 2024-09-23 RX ORDER — BUSPIRONE HYDROCHLORIDE 30 MG/1
30 TABLET ORAL 2 TIMES DAILY
Qty: 30 TABLET | Refills: 0 | Status: SHIPPED | OUTPATIENT
Start: 2024-09-23

## 2024-09-25 DIAGNOSIS — K21.9 GASTROESOPHAGEAL REFLUX DISEASE, UNSPECIFIED WHETHER ESOPHAGITIS PRESENT: ICD-10-CM

## 2024-09-25 RX ORDER — BUSPIRONE HYDROCHLORIDE 30 MG/1
30 TABLET ORAL 2 TIMES DAILY
Qty: 30 TABLET | Refills: 0 | OUTPATIENT
Start: 2024-09-25

## 2024-09-25 RX ORDER — PANTOPRAZOLE SODIUM 20 MG/1
20 TABLET, DELAYED RELEASE ORAL DAILY
Qty: 90 TABLET | Refills: 3 | Status: SHIPPED | OUTPATIENT
Start: 2024-09-25

## 2024-10-01 ENCOUNTER — READMISSION MANAGEMENT (OUTPATIENT)
Dept: CALL CENTER | Facility: HOSPITAL | Age: 44
End: 2024-10-01
Payer: COMMERCIAL

## 2024-10-02 ENCOUNTER — TRANSITIONAL CARE MANAGEMENT TELEPHONE ENCOUNTER (OUTPATIENT)
Dept: CALL CENTER | Facility: HOSPITAL | Age: 44
End: 2024-10-02
Payer: COMMERCIAL

## 2024-10-02 NOTE — OUTREACH NOTE
Call Center TCM Note      Flowsheet Row Responses   [facility] patient discharged from? Non-BH   Does the patient have one of the following disease processes/diagnoses(primary or secondary)? Other   TCM attempt successful? No   Unsuccessful attempts Attempt 2            Samina Brown RN    10/2/2024, 14:21 EDT

## 2024-10-02 NOTE — OUTREACH NOTE
Call Center TCM Note      Flowsheet Row Responses   [facility] patient discharged from? Non-BH   Does the patient have one of the following disease processes/diagnoses(primary or secondary)? Other   TCM attempt successful? No   Unsuccessful attempts Attempt 1  [Verbal release is blank]            Samina Brown RN    10/2/2024, 13:01 EDT
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
## 2024-10-03 ENCOUNTER — TRANSITIONAL CARE MANAGEMENT TELEPHONE ENCOUNTER (OUTPATIENT)
Dept: CALL CENTER | Facility: HOSPITAL | Age: 44
End: 2024-10-03
Payer: COMMERCIAL

## 2024-10-03 RX ORDER — SODIUM PICOSULFATE, MAGNESIUM OXIDE, AND ANHYDROUS CITRIC ACID 10; 3.5; 12 MG/160ML; G/160ML; G/160ML
350 LIQUID ORAL TAKE AS DIRECTED
Qty: 350 ML | Refills: 0 | Status: SHIPPED | OUTPATIENT
Start: 2024-10-03

## 2024-10-03 NOTE — OUTREACH NOTE
Call Center TCM Note      Flowsheet Row Responses   [facility] patient discharged from? Non-  []   Does the patient have one of the following disease processes/diagnoses(primary or secondary)? Other   TCM attempt successful? No   Unsuccessful attempts Attempt 3            Albina Petit RN    10/3/2024, 10:40 EDT

## 2024-10-10 ENCOUNTER — OFFICE VISIT (OUTPATIENT)
Dept: FAMILY MEDICINE CLINIC | Facility: CLINIC | Age: 44
End: 2024-10-10
Payer: COMMERCIAL

## 2024-10-10 VITALS
HEIGHT: 66 IN | OXYGEN SATURATION: 97 % | BODY MASS INDEX: 20.73 KG/M2 | SYSTOLIC BLOOD PRESSURE: 120 MMHG | WEIGHT: 129 LBS | HEART RATE: 86 BPM | DIASTOLIC BLOOD PRESSURE: 78 MMHG

## 2024-10-10 DIAGNOSIS — F43.21 GRIEF AT LOSS OF CHILD: ICD-10-CM
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
## 2024-10-10 DIAGNOSIS — M25.511 ACUTE PAIN OF RIGHT SHOULDER: Primary | ICD-10-CM

## 2024-10-10 DIAGNOSIS — Z63.4 GRIEF AT LOSS OF CHILD: ICD-10-CM

## 2024-10-10 DIAGNOSIS — T14.90XA TRAUMA: ICD-10-CM

## 2024-10-10 PROCEDURE — 3074F SYST BP LT 130 MM HG: CPT | Performed by: PHYSICIAN ASSISTANT

## 2024-10-10 PROCEDURE — 3078F DIAST BP <80 MM HG: CPT | Performed by: PHYSICIAN ASSISTANT

## 2024-10-10 PROCEDURE — 99214 OFFICE O/P EST MOD 30 MIN: CPT | Performed by: PHYSICIAN ASSISTANT

## 2024-10-10 PROCEDURE — 1126F AMNT PAIN NOTED NONE PRSNT: CPT | Performed by: PHYSICIAN ASSISTANT

## 2024-10-10 RX ORDER — ACETAMINOPHEN 325 MG/1
650 TABLET ORAL 4 TIMES DAILY
COMMUNITY
Start: 2024-10-02 | End: 2024-10-11 | Stop reason: SDUPTHER

## 2024-10-10 RX ORDER — METHOCARBAMOL 500 MG/1
500 TABLET, FILM COATED ORAL
COMMUNITY
Start: 2024-10-01 | End: 2024-10-11 | Stop reason: SDUPTHER

## 2024-10-10 SDOH — SOCIAL STABILITY - SOCIAL INSECURITY: DISSAPEARANCE AND DEATH OF FAMILY MEMBER: Z63.4
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
## 2024-10-11 ENCOUNTER — TELEPHONE (OUTPATIENT)
Dept: FAMILY MEDICINE CLINIC | Facility: CLINIC | Age: 44
End: 2024-10-11
Payer: COMMERCIAL

## 2024-10-11 RX ORDER — ACETAMINOPHEN 325 MG/1
650 TABLET ORAL 4 TIMES DAILY
Qty: 30 TABLET | Refills: 3 | Status: SHIPPED | OUTPATIENT
Start: 2024-10-11

## 2024-10-11 RX ORDER — METHOCARBAMOL 500 MG/1
500 TABLET, FILM COATED ORAL 2 TIMES DAILY
Qty: 20 TABLET | Refills: 0 | Status: SHIPPED | OUTPATIENT
Start: 2024-10-11 | End: 2024-10-21

## 2024-10-11 NOTE — TELEPHONE ENCOUNTER
Caller: Samina Maya     Relationship: Self     Best call back number:     [phone]         Which medication are you concerned about: PT STATED THAT SHE WAS SEEN FOR APPT YESTERDAY, WANTED TO MAKE SURE THAT BJ MCKEEGUSTAVO WILL BE SENDING OVER MEDICATION TO HER PHARMACY, PT HAS NOT HEARD BACK FROM ANYONE. SAID A MUSCLE RELAXER AND SOMETHING ELSE WAS SUPPOSED TO BE CALLED IN. ALSO NEEDS TO KNOW RESULTS OF THE XRAY OF [facility] [id number] [place], [place] - [facility] [address] - [phone]  - [phone] FX

## 2024-10-11 NOTE — TELEPHONE ENCOUNTER
Caller: Samina Maya    Relationship: Self    Best call back number:     [phone]       Which medication are you concerned about: PT STATED THAT SHE WAS SEEN FOR APPT YESTERDAY, WANTED TO MAKE SURE THAT BJ GUSTAVO WILL BE SENDING OVER MEDICATION TO HER PHARMACY, PT HAS NOT HEARD BACK FROM ANYONE.    [facility] [id number] [place], [place] - [facility] [address] - [phone]  - [phone] FX

## 2024-10-11 NOTE — TELEPHONE ENCOUNTER
Please call patient- I was under the impression that she had some muscle relaxer left from ED- and also have all xray results aback and ready to call for patient

## 2024-10-11 NOTE — TELEPHONE ENCOUNTER
Will send in refills on muscle relaxer and tylenol. It doesn't look from her xray or her exam the other day that she has a physical reason for the numbness. It may be that her muscle relaxer is wearing off and will improve when I get her refill in or as we talked about in her visit grief can manifest in odd ways as well.

## 2024-10-11 NOTE — TELEPHONE ENCOUNTER
Pt is out of muscle relaxer now and tylenol. Those were working well for her, please refill. I also informed her of her xray results, she wants to know why she is still having numbness in her right chest, shoulder, and arm.

## 2024-10-11 NOTE — PROGRESS NOTES
Please call the patient and let her know that she has no fracture or malalignment of her wrist. I would suggest physical therapy or being seen back by the surgeon who did her carpal tunnel surgery

## 2024-10-11 NOTE — PROGRESS NOTES
Please call patient and let her know that her foot xray is negative for any fracture or malalignment

## 2024-10-18 RX ORDER — BUSPIRONE HYDROCHLORIDE 30 MG/1
30 TABLET ORAL 2 TIMES DAILY
Qty: 30 TABLET | Refills: 0 | Status: SHIPPED | OUTPATIENT
Start: 2024-10-18

## 2024-10-21 RX ORDER — BUSPIRONE HYDROCHLORIDE 30 MG/1
30 TABLET ORAL 2 TIMES DAILY
Qty: 30 TABLET | Refills: 0 | OUTPATIENT
Start: 2024-10-21

## 2024-10-24 ENCOUNTER — OFFICE VISIT (OUTPATIENT)
Dept: FAMILY MEDICINE CLINIC | Facility: CLINIC | Age: 44
End: 2024-10-24
Payer: COMMERCIAL

## 2024-10-24 VITALS
TEMPERATURE: 98.4 F | OXYGEN SATURATION: 98 % | DIASTOLIC BLOOD PRESSURE: 76 MMHG | WEIGHT: 127.5 LBS | HEIGHT: 66 IN | BODY MASS INDEX: 20.49 KG/M2 | HEART RATE: 60 BPM | SYSTOLIC BLOOD PRESSURE: 114 MMHG

## 2024-10-24 DIAGNOSIS — M54.2 NECK PAIN: ICD-10-CM

## 2024-10-24 DIAGNOSIS — R07.9 RIGHT-SIDED CHEST PAIN: Primary | ICD-10-CM

## 2024-10-24 DIAGNOSIS — D72.829 LEUKOCYTOSIS, UNSPECIFIED TYPE: ICD-10-CM

## 2024-10-24 DIAGNOSIS — R07.1 PAINFUL RESPIRATION: ICD-10-CM

## 2024-10-24 DIAGNOSIS — I25.2 HISTORY OF MI (MYOCARDIAL INFARCTION): ICD-10-CM

## 2024-10-24 PROCEDURE — 3074F SYST BP LT 130 MM HG: CPT | Performed by: NURSE PRACTITIONER

## 2024-10-24 PROCEDURE — 99214 OFFICE O/P EST MOD 30 MIN: CPT | Performed by: NURSE PRACTITIONER

## 2024-10-24 PROCEDURE — 1160F RVW MEDS BY RX/DR IN RCRD: CPT | Performed by: NURSE PRACTITIONER

## 2024-10-24 PROCEDURE — 1126F AMNT PAIN NOTED NONE PRSNT: CPT | Performed by: NURSE PRACTITIONER

## 2024-10-24 PROCEDURE — 1159F MED LIST DOCD IN RCRD: CPT | Performed by: NURSE PRACTITIONER

## 2024-10-24 PROCEDURE — 3078F DIAST BP <80 MM HG: CPT | Performed by: NURSE PRACTITIONER

## 2024-10-24 RX ORDER — AZITHROMYCIN 250 MG/1
TABLET, FILM COATED ORAL
Qty: 6 TABLET | Refills: 0 | Status: SHIPPED | OUTPATIENT
Start: 2024-10-24 | End: 2024-10-29

## 2024-10-24 RX ORDER — PREDNISONE 20 MG/1
TABLET ORAL
Qty: 14 TABLET | Refills: 0 | Status: SHIPPED | OUTPATIENT
Start: 2024-10-24
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
## 2024-10-25 DIAGNOSIS — R93.89 ABNORMAL CHEST X-RAY: ICD-10-CM

## 2024-10-25 DIAGNOSIS — R07.1 PAINFUL RESPIRATION: Primary | ICD-10-CM

## 2024-10-25 LAB
ALBUMIN SERPL-MCNC: 4.3 G/DL (ref 3.9–4.9)
ALP SERPL-CCNC: 154 IU/L (ref 44–121)
ALT SERPL-CCNC: 16 IU/L (ref 0–32)
AST SERPL-CCNC: 18 IU/L (ref 0–40)
BASOPHILS # BLD AUTO: 0.1 X10E3/UL (ref 0–0.2)
BASOPHILS NFR BLD AUTO: 1 %
BILIRUB SERPL-MCNC: 0.2 MG/DL (ref 0–1.2)
BUN SERPL-MCNC: 10 MG/DL (ref 6–24)
BUN/CREAT SERPL: 16 (ref 9–23)
CALCIUM SERPL-MCNC: 9.5 MG/DL (ref 8.7–10.2)
CHLORIDE SERPL-SCNC: 104 MMOL/L (ref 96–106)
CO2 SERPL-SCNC: 23 MMOL/L (ref 20–29)
CREAT SERPL-MCNC: 0.61 MG/DL (ref 0.57–1)
EGFRCR SERPLBLD CKD-EPI 2021: 113 ML/MIN/1.73
EOSINOPHIL # BLD AUTO: 0.5 X10E3/UL (ref 0–0.4)
EOSINOPHIL NFR BLD AUTO: 4 %
ERYTHROCYTE [DISTWIDTH] IN BLOOD BY AUTOMATED COUNT: 13.6 % (ref 11.7–15.4)
GLOBULIN SER CALC-MCNC: 3 G/DL (ref 1.5–4.5)
GLUCOSE SERPL-MCNC: 84 MG/DL (ref 70–99)
HCT VFR BLD AUTO: 38.7 % (ref 34–46.6)
HGB BLD-MCNC: 12.3 G/DL (ref 11.1–15.9)
IMM GRANULOCYTES # BLD AUTO: 0 X10E3/UL (ref 0–0.1)
IMM GRANULOCYTES NFR BLD AUTO: 0 %
LYMPHOCYTES # BLD AUTO: 3.4 X10E3/UL (ref 0.7–3.1)
LYMPHOCYTES NFR BLD AUTO: 26 %
MCH RBC QN AUTO: 30.7 PG (ref 26.6–33)
MCHC RBC AUTO-ENTMCNC: 31.8 G/DL (ref 31.5–35.7)
MCV RBC AUTO: 97 FL (ref 79–97)
MONOCYTES # BLD AUTO: 1 X10E3/UL (ref 0.1–0.9)
MONOCYTES NFR BLD AUTO: 8 %
NEUTROPHILS # BLD AUTO: 7.9 X10E3/UL (ref 1.4–7)
NEUTROPHILS NFR BLD AUTO: 61 %
PLATELET # BLD AUTO: 483 X10E3/UL (ref 150–450)
POTASSIUM SERPL-SCNC: 4.5 MMOL/L (ref 3.5–5.2)
PROT SERPL-MCNC: 7.3 G/DL (ref 6–8.5)
RBC # BLD AUTO: 4.01 X10E6/UL (ref 3.77–5.28)
SODIUM SERPL-SCNC: 141 MMOL/L (ref 134–144)
WBC # BLD AUTO: 13 X10E3/UL (ref 3.4–10.8)

## 2024-10-28 DIAGNOSIS — R79.89 ELEVATED PLATELET COUNT: ICD-10-CM

## 2024-10-28 DIAGNOSIS — R79.89 ABNORMAL CBC: ICD-10-CM

## 2024-10-28 DIAGNOSIS — D72.829 LEUKOCYTOSIS, UNSPECIFIED TYPE: Primary | ICD-10-CM

## 2024-10-31 ENCOUNTER — OFFICE VISIT (OUTPATIENT)
Dept: CARDIOLOGY | Facility: CLINIC | Age: 44
End: 2024-10-31
Payer: COMMERCIAL

## 2024-10-31 VITALS
TEMPERATURE: 97 F | WEIGHT: 129 LBS | HEART RATE: 74 BPM | SYSTOLIC BLOOD PRESSURE: 112 MMHG | DIASTOLIC BLOOD PRESSURE: 74 MMHG | HEIGHT: 66 IN | RESPIRATION RATE: 18 BRPM | BODY MASS INDEX: 20.73 KG/M2 | OXYGEN SATURATION: 99 %

## 2024-10-31 DIAGNOSIS — I25.10 CORONARY ARTERY DISEASE INVOLVING NATIVE CORONARY ARTERY OF NATIVE HEART WITHOUT ANGINA PECTORIS: Primary | ICD-10-CM

## 2024-10-31 DIAGNOSIS — K58.0 IRRITABLE BOWEL SYNDROME WITH DIARRHEA: ICD-10-CM

## 2024-10-31 DIAGNOSIS — Z72.0 TOBACCO ABUSE: ICD-10-CM

## 2024-10-31 DIAGNOSIS — I10 ESSENTIAL HYPERTENSION: ICD-10-CM

## 2024-10-31 DIAGNOSIS — E78.2 MIXED HYPERLIPIDEMIA: ICD-10-CM

## 2024-10-31 PROCEDURE — 3074F SYST BP LT 130 MM HG: CPT | Performed by: INTERNAL MEDICINE

## 2024-10-31 PROCEDURE — 93000 ELECTROCARDIOGRAM COMPLETE: CPT | Performed by: INTERNAL MEDICINE

## 2024-10-31 PROCEDURE — 3078F DIAST BP <80 MM HG: CPT | Performed by: INTERNAL MEDICINE

## 2024-10-31 PROCEDURE — 1160F RVW MEDS BY RX/DR IN RCRD: CPT | Performed by: INTERNAL MEDICINE

## 2024-10-31 PROCEDURE — 99214 OFFICE O/P EST MOD 30 MIN: CPT | Performed by: INTERNAL MEDICINE

## 2024-10-31 PROCEDURE — 1159F MED LIST DOCD IN RCRD: CPT | Performed by: INTERNAL MEDICINE

## 2024-10-31 RX ORDER — VALSARTAN 160 MG/1
160 TABLET ORAL DAILY
Qty: 90 TABLET | Refills: 3 | Status: SHIPPED | OUTPATIENT
Start: 2024-10-31

## 2024-10-31 RX ORDER — BUSPIRONE HYDROCHLORIDE 30 MG/1
30 TABLET ORAL 2 TIMES DAILY
Qty: 30 TABLET | Refills: 0 | Status: SHIPPED | OUTPATIENT
Start: 2024-10-31

## 2024-10-31 RX ORDER — RIFAXIMIN 550 MG/1
550 TABLET ORAL 3 TIMES DAILY
Qty: 42 TABLET | Refills: 0 | Status: SHIPPED | OUTPATIENT
Start: 2024-10-31

## 2024-10-31 NOTE — TELEPHONE ENCOUNTER
Rx Refill Note  Pending Prescriptions:                       Disp   Refills    Xifaxan 550 MG tablet [Pharmacy Med Name: *42 tab*0        Sig: TAKE 1 TABLET BY MOUTH 3 TIMES A DAY    Last office visit with prescribing clinician: 5/9/2024   Last telemedicine visit with prescribing clinician: Visit date not found   Next office visit with prescribing clinician: Visit date not found         Nilsa Locke MA  10/31/24, 08:19 EDT
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
## 2024-11-12 ENCOUNTER — OFFICE VISIT (OUTPATIENT)
Dept: PULMONOLOGY | Facility: CLINIC | Age: 44
End: 2024-11-12
Payer: COMMERCIAL

## 2024-11-12 VITALS
HEIGHT: 66 IN | DIASTOLIC BLOOD PRESSURE: 72 MMHG | OXYGEN SATURATION: 99 % | HEART RATE: 69 BPM | BODY MASS INDEX: 21.21 KG/M2 | WEIGHT: 132 LBS | SYSTOLIC BLOOD PRESSURE: 120 MMHG

## 2024-11-12 DIAGNOSIS — Z87.09 HISTORY OF ASTHMA: ICD-10-CM

## 2024-11-12 DIAGNOSIS — Z72.0 TOBACCO ABUSE: ICD-10-CM

## 2024-11-12 DIAGNOSIS — R06.02 SOB (SHORTNESS OF BREATH): Primary | ICD-10-CM

## 2024-11-12 DIAGNOSIS — R07.9 RIGHT-SIDED CHEST PAIN: ICD-10-CM

## 2024-11-12 DIAGNOSIS — K21.9 GASTROESOPHAGEAL REFLUX DISEASE, UNSPECIFIED WHETHER ESOPHAGITIS PRESENT: ICD-10-CM

## 2024-11-12 DIAGNOSIS — R94.2 RESTRICTIVE PATTERN PRESENT ON PULMONARY FUNCTION TESTING: ICD-10-CM

## 2024-11-12 PROBLEM — R07.1 PAINFUL RESPIRATION: Status: RESOLVED | Noted: 2024-10-24 | Resolved: 2024-11-12

## 2024-11-12 RX ORDER — BUDESONIDE AND FORMOTEROL FUMARATE DIHYDRATE 160; 4.5 UG/1; UG/1
2 AEROSOL RESPIRATORY (INHALATION)
Qty: 10.2 G | Refills: 12 | Status: SHIPPED | OUTPATIENT
Start: 2024-11-12

## 2024-11-12 RX ORDER — ALBUTEROL SULFATE 90 UG/1
2 INHALANT RESPIRATORY (INHALATION) EVERY 4 HOURS PRN
Qty: 18 G | Refills: 11 | Status: SHIPPED | OUTPATIENT
Start: 2024-11-12

## 2024-11-13 ENCOUNTER — TELEMEDICINE (OUTPATIENT)
Dept: PSYCHIATRY | Facility: CLINIC | Age: 44
End: 2024-11-13
Payer: COMMERCIAL

## 2024-11-13 DIAGNOSIS — F43.23 ADJUSTMENT DISORDER WITH MIXED ANXIETY AND DEPRESSED MOOD: Primary | ICD-10-CM
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
## 2024-11-15 ENCOUNTER — CLINICAL SUPPORT (OUTPATIENT)
Dept: CARDIOLOGY | Facility: CLINIC | Age: 44
End: 2024-11-15
Payer: COMMERCIAL

## 2024-11-15 ENCOUNTER — TELEPHONE (OUTPATIENT)
Dept: CARDIOLOGY | Facility: CLINIC | Age: 44
End: 2024-11-15

## 2024-11-15 DIAGNOSIS — I25.10 CORONARY ARTERY DISEASE INVOLVING NATIVE CORONARY ARTERY OF NATIVE HEART WITHOUT ANGINA PECTORIS: Primary | ICD-10-CM

## 2024-11-15 DIAGNOSIS — I10 ESSENTIAL HYPERTENSION: ICD-10-CM

## 2024-11-15 DIAGNOSIS — I25.2 HISTORY OF MI (MYOCARDIAL INFARCTION): ICD-10-CM

## 2024-11-15 DIAGNOSIS — R06.02 SOB (SHORTNESS OF BREATH): ICD-10-CM

## 2024-11-15 PROCEDURE — 36415 COLL VENOUS BLD VENIPUNCTURE: CPT | Performed by: INTERNAL MEDICINE

## 2024-11-15 NOTE — PROGRESS NOTES
Venipuncture Blood Specimen Collection  Venipuncture performed in clinic by Brie Echeverria RN with good hemostasis. Patient tolerated the procedure well without complications.   11/15/24   Brie Echeverria RN

## 2024-11-15 NOTE — TELEPHONE ENCOUNTER
----- Message from Priyank Day sent at 11/15/2024  4:09 PM EST -----  Echo was good, no contusion in the heart, no fluid around the heart. Left message for pt hub can tell pt

## 2024-11-17 LAB — APO B SERPL-MCNC: 48 MG/DL

## 2024-11-18 ENCOUNTER — TELEPHONE (OUTPATIENT)
Dept: CARDIOLOGY | Facility: CLINIC | Age: 44
End: 2024-11-18
Payer: COMMERCIAL

## 2024-11-18 ENCOUNTER — LAB (OUTPATIENT)
Dept: FAMILY MEDICINE CLINIC | Facility: CLINIC | Age: 44
End: 2024-11-18
Payer: COMMERCIAL

## 2024-11-18 NOTE — TELEPHONE ENCOUNTER
----- Message from Priyank Day sent at 11/15/2024  4:09 PM EST -----  Echo was good, no contusion in the heart, no fluid around the heart. Spoke with pt she understood message .

## 2024-11-19 ENCOUNTER — TELEPHONE (OUTPATIENT)
Dept: CARDIOLOGY | Facility: CLINIC | Age: 44
End: 2024-11-19
Payer: COMMERCIAL

## 2024-11-19 LAB
ALBUMIN SERPL-MCNC: 4.7 G/DL (ref 3.9–4.9)
ALP SERPL-CCNC: 138 IU/L (ref 44–121)
ALT SERPL-CCNC: 22 IU/L (ref 0–32)
AST SERPL-CCNC: 22 IU/L (ref 0–40)
BASOPHILS # BLD AUTO: 0.1 X10E3/UL (ref 0–0.2)
BASOPHILS NFR BLD AUTO: 1 %
BILIRUB SERPL-MCNC: 0.2 MG/DL (ref 0–1.2)
BUN SERPL-MCNC: 12 MG/DL (ref 6–24)
BUN/CREAT SERPL: 14 (ref 9–23)
CALCIUM SERPL-MCNC: 9.6 MG/DL (ref 8.7–10.2)
CHLORIDE SERPL-SCNC: 102 MMOL/L (ref 96–106)
CHOLEST SERPL-MCNC: 144 MG/DL (ref 100–199)
CK SERPL-CCNC: 96 U/L (ref 32–182)
CO2 SERPL-SCNC: 19 MMOL/L (ref 20–29)
CREAT SERPL-MCNC: 0.84 MG/DL (ref 0.57–1)
EGFRCR SERPLBLD CKD-EPI 2021: 88 ML/MIN/1.73
EOSINOPHIL # BLD AUTO: 0.5 X10E3/UL (ref 0–0.4)
EOSINOPHIL NFR BLD AUTO: 5 %
ERYTHROCYTE [DISTWIDTH] IN BLOOD BY AUTOMATED COUNT: 12.5 % (ref 11.7–15.4)
GLOBULIN SER CALC-MCNC: 2.8 G/DL (ref 1.5–4.5)
GLUCOSE SERPL-MCNC: 140 MG/DL (ref 70–99)
HCT VFR BLD AUTO: 43.1 % (ref 34–46.6)
HDLC SERPL-MCNC: 59 MG/DL
HGB BLD-MCNC: 14 G/DL (ref 11.1–15.9)
IMM GRANULOCYTES # BLD AUTO: 0 X10E3/UL (ref 0–0.1)
IMM GRANULOCYTES NFR BLD AUTO: 0 %
LDLC SERPL CALC-MCNC: 68 MG/DL (ref 0–99)
LPA SERPL-SCNC: 43.4 NMOL/L
LYMPHOCYTES # BLD AUTO: 3.4 X10E3/UL (ref 0.7–3.1)
LYMPHOCYTES NFR BLD AUTO: 29 %
MCH RBC QN AUTO: 29.9 PG (ref 26.6–33)
MCHC RBC AUTO-ENTMCNC: 32.5 G/DL (ref 31.5–35.7)
MCV RBC AUTO: 92 FL (ref 79–97)
MONOCYTES # BLD AUTO: 0.8 X10E3/UL (ref 0.1–0.9)
MONOCYTES NFR BLD AUTO: 7 %
NEUTROPHILS # BLD AUTO: 7 X10E3/UL (ref 1.4–7)
NEUTROPHILS NFR BLD AUTO: 58 %
PLATELET # BLD AUTO: 416 X10E3/UL (ref 150–450)
POTASSIUM SERPL-SCNC: 4.8 MMOL/L (ref 3.5–5.2)
PROT SERPL-MCNC: 7.5 G/DL (ref 6–8.5)
RBC # BLD AUTO: 4.68 X10E6/UL (ref 3.77–5.28)
SODIUM SERPL-SCNC: 138 MMOL/L (ref 134–144)
TRIGL SERPL-MCNC: 91 MG/DL (ref 0–149)
VLDLC SERPL CALC-MCNC: 17 MG/DL (ref 5–40)
WBC # BLD AUTO: 11.9 X10E3/UL (ref 3.4–10.8)

## 2024-11-19 NOTE — TELEPHONE ENCOUNTER
Call placed to patient to discuss   results. No answer, left voicemail requesting call back. OK for Hub to relay         Echo was good and lab are good LDL is 40, keep taking same meds

## 2024-11-19 NOTE — TELEPHONE ENCOUNTER
----- Message from Jaimee CM sent at 11/19/2024 10:08 AM EST -----    ----- Message -----  From: Priyank Day MD  Sent: 11/18/2024   7:19 PM EST  To: Jaimee Llamas RN    Echo was good and lab are good LDL is 40, keep taking same meds   Caller: Chandu Headley    Relationship: Self    Best call back number: [phone]    Requested Prescriptions:   Requested Prescriptions     Pending Prescriptions Disp Refills   • lisinopril (PRINIVIL,ZESTRIL) 20 MG tablet 7 tablet 0     Sig: Take 1 tablet by mouth every night at bedtime.   • FLUoxetine (PROzac) 40 MG capsule 14 capsule 0     Sig: Take 1 capsule by mouth Daily.        Pharmacy where request should be sent: [facility][id number] [facility] [address] AT IN [facility] - [phone] [facility] [phone]      Additional details provided by patient: PATIENT STATES HE IS COMPLETELY OUT OF THE LISINOPRIL MEDICATION AND ONLY HAS 3 DAYS LEFT ON THE PROZAC.    PATIENT SCHEDULED A MY CHART VIDEO VISIT FOR MEDICATION REFILL ON 08/15/2022.     Does the patient have less than a 3 day supply:  [x] Yes  [] No    Jared Brennan Rep   08/08/22 11:19 EDT

## 2024-12-10 ENCOUNTER — TELEMEDICINE (OUTPATIENT)
Dept: PSYCHIATRY | Facility: CLINIC | Age: 44
End: 2024-12-10
Payer: COMMERCIAL

## 2024-12-10 DIAGNOSIS — F43.23 ADJUSTMENT DISORDER WITH MIXED ANXIETY AND DEPRESSED MOOD: Primary | ICD-10-CM

## 2024-12-10 NOTE — PROGRESS NOTES
Mode of Visit: Video  Location of patient: -HOME-  Location of provider: +HOME+  You have chosen to receive care through a telehealth visit.  The patient has signed the video visit consent form.  The visit included audio and video interaction. No technical issues occurred during this visit.    Date: December 10, 2024  Time In: 09:52 AM  Time out: 10:30 AM    This provider is located at [facility], [address] [place] [address], [facility], using a secure [place] Video Visit through [email or web address]. Patient is being seen remotely via telehealth at their home address is located in [place]. Patient stated they are in a secure environment for this session. The patient's condition being diagnosed and treated is appropriate for telemedicine. The provider identified themself as well as their credentials. The patient, or  patient's legal guardian consent to be seen remotely, and when consent is given they understand that the consent allows for patient identifiable information to be sent to a third party as needed. They may refuse to be seen remotely at any time. The electronic data is encrypted and password protected, and the patient's or  legal guardian has been advised of the potential risks to privacy not withstanding such measures.   PT Identifiers used: Name and .    You have chosen to receive care through a telehealth visit.  Do you consent to use a video/audio connection for your medical care today? Yes    Subjective   Samina Maya is a 44 y.o. female who presents today for follow up    Chief Complaint:   Chief Complaint   Patient presents with    Anxiety    Depression        History of Present Illness:   Anxiety  Presents for follow-up visit.  Symptoms include decreased concentration, depressed mood, excessive worry, irritability, malaise, muscle tension, nervous/anxious behavior, restlessness and malaise/fatigue. Symptoms occur constantly. The severity of symptoms is interfering with daily  activities. The quality of sleep is non-restorative. Awakens often during the night. Her past medical history is significant for depression. Past treatments include SSRIs and non-benzodiazephine anxiolytics. The treatment provided moderate relief. Compliance with medications is %.   Depression  Presents for follow-up visit. Symptoms include decreased concentration, depressed mood, excessive worry, feelings of hopelessness, irritability, malaise, muscle tension, nervousness/anxiety, restlessness, malaise/fatigue and difficulty controlling mood. Symptoms occur most days.  The severity of symptoms is interfering with daily activities.  The quality of sleep is non-restorative. Awakens often during the night. Her past medical history is significant for depression. Past treatments include SSRI and non-benzodiazephine anxiolytics. The treatment provides moderate relief. Compliance with medications is %.          Clinical Maneuvering/Intervention:      Assisted patient in processing above session content; acknowledged and normalized patient’s thoughts, feelings, and concerns.  Rationalized patient thought process regarding concerns presented at session.  Discussed triggers associated with patient's  anxiety  and depression  Also discussed coping skills for patient to implement such as self care  and positive self talk     Allowed patient to freely discuss issues without interruption or judgment. Provided safe, confidential environment to facilitate the development of positive therapeutic relationship and encourage open, honest communication. Assisted patient in identifying risk factors which would indicate the need for higher level of care including thoughts to harm self or others and/or self-harming behavior and encouraged patient to contact this office, call 911, or present to the nearest emergency room should any of these events occur. Discussed crisis intervention services and means to access. Patient  adamantly and convincingly denies current suicidal or homicidal ideation or perceptual disturbance.    Assessment: Patient reported no active suicidal ideations, self-injurious behaviors, or homicidal ideations. Patient reported sleeping difficulties and appetite is poor at this time.  Therapist began using Sunitha with patient to build rapport and to provide unconditional positive regard.  Patient discussed triggers for anxiety and depression including recent news about mother's health.  Patient reported she feels an increase of stress due to the addition of more responsibility of caring for her mother due to being an only child.  Therapist and patient began to explore appropriate coping strategies including mindfulness, seeking outside support via support groups, and self-care.  Patient and therapist also discussed reframing of negative thought processes patient can implement when she feels inappropriate guilt.    Patient appears to maintain relative stability as compared to their baseline.  However, patient continues to struggle with   Chief Complaint   Patient presents with    Anxiety    Depression    which continues to cause impairment in important areas of functioning.  A result, they can be reasonably expected to continue to benefit from treatment and would likely be at increased risk for decompensation otherwise.      PHQ-Score Total:  PHQ-9 Total Score: 20    CONSTANZA-7 Score Total:  Over the last two weeks, how often have you been bothered by the following problems?  Feeling nervous, anxious or on edge: Nearly every day  Not being able to stop or control worrying: Not at all  Worrying too much about different things: Not at all  Trouble Relaxing: Nearly every day  Being so restless that it is hard to sit still: Not at all  Becoming easily annoyed or irritable: Nearly every day  Feeling afraid as if something awful might happen: Nearly every day  CONSTANZA 7 Total Score: 12  If you checked any problems, how difficult  have these problems made it for you to do your work, take care of things at home, or get along with other people: Somewhat difficult      MENTAL STATUS EXAM   General Appearance:  Cleanly groomed and dressed and well developed  Eye Contact:  Good eye contact  Attitude:  Cooperative and polite  Motor Activity:  Normal gait, posture  Muscle Strength:  Normal  Speech:  Normal rate, tone, volume  Language:  Stereotypical  Mood and affect:  Normal, pleasant, appropriate, mood congruent and euthymic  Hopelessness:  6  Loneliness: Denies and 8  Thought Process:  Logical and goal-directed  Associations/ Thought Content:  No delusions  Hallucinations:  None  Suicidal Ideations:  Not present  Homicidal Ideation:  Not present  Sensorium:  Alert and clear  Orientation:  Person, place, time and situation  Immediate Recall, Recent, and Remote Memory:  Intact  Attention Span/ Concentration:  Good  Fund of Knowledge:  Appropriate for age and educational level  Intellectual Functioning:  Average range  Insight:  Good  Judgement:  Good  Reliability:  Good  Impulse Control:  Good         Patient's Support Network Includes:  mother    Progress toward goal: Not at goal    Prognosis: Good with Ongoing Treatment     Plan: Patient will create clinical treatment plan in next session. Patient was in agreement with ongoing therapeutic mental health services to assist with stabilization and overall improved quality of life through the use of psychoeducation and coping skills.     Patient will continue in individual outpatient therapy with focus on improved functioning and coping skills, maintaining stability, and avoiding decompensation and the need for higher level of care.    Patient will adhere to medication regimen as prescribed and report any side effects. Patient will contact this office, call 911 or present to the nearest emergency room should suicidal or homicidal ideations occur. Provide Cognitive Behavioral Therapy and Solution  Focused Therapy to improve functioning, maintain stability, and avoid decompensation and the need for higher level of care.     Return in about 1 month (around 1/10/2025) for Video visit, Next scheduled follow up.      VISIT DIAGNOSIS:    Diagnosis Plan   1. Adjustment disorder with mixed anxiety and depressed mood                      This document has been electronically signed by SHIVAM Meneses  December 10, 2024      Part of this note may be an electronic transcription/translation of spoken language to printed text using the Dragon Dictation System.

## 2024-12-16 RX ORDER — BUSPIRONE HYDROCHLORIDE 30 MG/1
30 TABLET ORAL 2 TIMES DAILY
Qty: 30 TABLET | Refills: 0 | Status: SHIPPED | OUTPATIENT
Start: 2024-12-16

## 2025-01-07 RX ORDER — BUSPIRONE HYDROCHLORIDE 30 MG/1
30 TABLET ORAL 2 TIMES DAILY
Qty: 30 TABLET | Refills: 0 | Status: SHIPPED | OUTPATIENT
Start: 2025-01-07

## 2025-01-10 ENCOUNTER — TELEMEDICINE (OUTPATIENT)
Dept: PSYCHIATRY | Facility: CLINIC | Age: 45
End: 2025-01-10
Payer: COMMERCIAL

## 2025-01-10 DIAGNOSIS — F43.23 ADJUSTMENT DISORDER WITH MIXED ANXIETY AND DEPRESSED MOOD: Primary | ICD-10-CM

## 2025-01-10 NOTE — TREATMENT PLAN
"Multi-Disciplinary Problems (from Behavioral Health Treatment Plan)      Active Problems       Problem: Anxiety  Start Date: 01/10/25      Problem Details: The patient self-scales this problem as a 7 with 10 being the worst.      Patient reported excessive worry that is difficult to control more often than not for at least the last six months. Patient reported symptoms associated as poor sleep, irritability, restlessness, muscle tension, fatigue.         Goal Priority Start Date Expected End Date End Date    Patient will develop and implement behavioral and cognitive strategies to reduce anxiety and irrational fears. Medium 01/10/25 07/11/25 --    Goal Details: Progress toward goal:  Not appropriate to rate progress toward goal since this is the initial treatment plan.    \"I ant to learn how to deal with my worry so it's not so bad all of the time.\"    Patient will self-report overall reduction in average daily anxiety level from 7/10 to at least a 5/10 within the next three months.         Goal Intervention Frequency Start Date End Date    Help patient explore past emotional issues in relation to present anxiety. Q Month 01/10/25 --    Intervention Details: Duration of treatment until remission of symptoms.      Patient will explore past emotional issues and process how these relate to present-day anxiety at least once per month for the next three months.         Goal Intervention Frequency Start Date End Date    Help patient develop an awareness of their cognitive and physical responses to anxiety. Q Month 01/10/25 --    Intervention Details: Duration of treatment until remission of symptoms.    Patient will demonstrate ability to identify cognitive and physical responses to anxiety at least once per month for the next three months.                 Problem: Depression  Start Date: 01/10/25      Problem Details: The patient self-scales this problem as a 9 with 10 being the worst.      Patient reported low mood and " "loss of interest in previously enjoyed activities more often than not for at least the last two weeks. Patient reported poor concentration, sleep disturbances, unexplained weight gain, and inappropriate guilt.         Goal Priority Start Date Expected End Date End Date    Patient will demonstrate the ability to initiate new constructive life skills outside of sessions on a consistent basis. High 01/10/25 07/11/25 --    Goal Details: Progress toward goal:  Not appropriate to rate progress toward goal since this is the initial treatment plan.    \"I want to be able to be happy like I used to be.\"    Patient will self-report reduction in daily depression average from 9/10 to at least a 7/10 within the next three months.         Goal Intervention Frequency Start Date End Date    Assist patient in setting attainable activities of daily living goals. Q Month 01/10/25 --    Intervention Details: Patient will set at least two daily living goals and work towards completion of these goals at least once per month for the next three months.         Goal Intervention Frequency Start Date End Date    Provide education about depression Q Month 01/10/25 --    Intervention Details: Duration of treatment until remission of symptoms.      Patient will receive psychoeducation surrounding depression at least once per month for the next three months.         Goal Intervention Frequency Start Date End Date    Assist patient in developing healthy coping strategies. Q Month 01/10/25 --    Intervention Details: Duration of treatment until remission of symptoms.    Patient will learn and practice at least three new coping skills per month, with demonstrated mastery of at least five total coping skills within the next three months.                 Problem: Ineffective Coping  Start Date: 01/10/25      Problem Details: The patient self-scales this problem as a 7 with 10 being the worst.      Patient reported ineffective coping strategies are " "contributing to mental health symptoms and overall quality of life.         Goal Priority Start Date Expected End Date End Date    Patient will demonstrate the ability to initiate new constructive life skills consistently. Medium 01/10/25 07/11/25 --    Goal Details: Progress toward goal:  Not appropriate to rate progress toward goal since this is the initial treatment plan.    \"I need to learn better ways to cope with things.\"    Patient will self-report overall decrease in average of ineffective coping from 7/10 to at least 5/10 within the next three months.         Goal Intervention Frequency Start Date End Date    Assist patient in identifying healthy coping behaviors. Q Month 01/10/25 --    Intervention Details: Duration of treatment until remission of symptoms.      Patient will learn and practice at least three new coping strategies per month and demonstrate mastery of at least six total coping skills within the next three months.                                "

## 2025-01-10 NOTE — PROGRESS NOTES
Mode of Visit: Video  Location of patient: -HOME-  Location of provider: +HOME+  You have chosen to receive care through a telehealth visit.  The patient has signed the video visit consent form.  The visit included audio and video interaction. No technical issues occurred during this visit.    Date: January 10, 2025  Time In: 09:57 AM  Time out: 10:35 AM    This provider is located at [facility], [address] [place] [address], [facility], using a secure [place] Video Visit through [employer]. Patient is being seen remotely via telehealth at their home address is located in [place]. Patient stated they are in a secure environment for this session. The patient's condition being diagnosed and treated is appropriate for telemedicine. The provider identified themself as well as their credentials. The patient, or  patient's legal guardian consent to be seen remotely, and when consent is given they understand that the consent allows for patient identifiable information to be sent to a third party as needed. They may refuse to be seen remotely at any time. The electronic data is encrypted and password protected, and the patient's or  legal guardian has been advised of the potential risks to privacy not withstanding such measures.   PT Identifiers used: Name and .    You have chosen to receive care through a telehealth visit.  Do you consent to use a video/audio connection for your medical care today? Yes    Subjective   Samina Maya is a 44 y.o. female who presents today for follow up    Chief Complaint:   Chief Complaint   Patient presents with    Anxiety    Depression        History of Present Illness:   Anxiety  Presents for follow-up visit.  Symptoms include decreased concentration, depressed mood, excessive worry, insomnia, irritability, malaise, muscle tension, nervous/anxious behavior, restlessness and malaise/fatigue. Symptoms occur most days. The severity of symptoms is interfering with daily  activities. The quality of sleep is non-restorative. Awakens often during the night. Her past medical history is significant for depression. Past treatments include SSRIs, non-benzodiazephine anxiolytics and lifestyle changes. The treatment provided moderate relief. Compliance with medications is %.   Depression  Presents for follow-up visit. Symptoms include decreased concentration, depressed mood, excessive worry, feelings of hopelessness, insomnia, irritability, malaise, muscle tension, nervousness/anxiety, restlessness, malaise/fatigue and difficulty controlling mood. Symptoms occur most days.  The severity of symptoms is interfering with daily activities.  The quality of sleep is non-restorative. Awakens often during the night. Her past medical history is significant for depression. Past treatments include SSRI, non-benzodiazephine anxiolytics, lifestyle changes and medications. The treatment provides moderate relief. Compliance with medications is %.          Clinical Maneuvering/Intervention:      Assisted patient in processing above session content; acknowledged and normalized patient’s thoughts, feelings, and concerns.  Rationalized patient thought process regarding concerns presented at session.  Discussed triggers associated with patient's  anxiety  and depression  Also discussed coping skills for patient to implement such as increasing activity , self care , and positive self talk     Allowed patient to freely discuss issues without interruption or judgment. Provided safe, confidential environment to facilitate the development of positive therapeutic relationship and encourage open, honest communication. Assisted patient in identifying risk factors which would indicate the need for higher level of care including thoughts to harm self or others and/or self-harming behavior and encouraged patient to contact this office, call 911, or present to the nearest emergency room should any of these  "events occur. Discussed crisis intervention services and means to access. Patient adamantly and convincingly denies current suicidal or homicidal ideation or perceptual disturbance.    Assessment: Patient reported no active suicidal ideations, self-injurious behaviors, or homicidal ideations. Patient reported continued onset insomnia, and is only getting \"around three or four hours a night.\" Patient reported low appetite but noticed unexplained weight gain. Therapist continued to use Sunitha with patient to build rapport and to provide unconditional positive regard. Patient and therapist collaborated to create clinical treatment plan in session today. Patient and therapist continue to process triggers for anxiety and depression with patient identifying current health, stress of being mother's caretaker, and chronic physical pain.  Patient and therapist discussed appropriate coping strategies including daily self-care, increased involvement in enjoyed activities, and practicing of reframing negative thought processes.      Patient appears to maintain relative stability as compared to their baseline.  However, patient continues to struggle with   Chief Complaint   Patient presents with    Anxiety    Depression    which continues to cause impairment in important areas of functioning.  A result, they can be reasonably expected to continue to benefit from treatment and would likely be at increased risk for decompensation otherwise.      PHQ-Score Total:  PHQ-9 Total Score: 18    CONSTANZA-7 Score Total:  Over the last two weeks, how often have you been bothered by the following problems?  Feeling nervous, anxious or on edge: More than half the days  Not being able to stop or control worrying: Not at all  Worrying too much about different things: Nearly every day  Trouble Relaxing: Nearly every day  Being so restless that it is hard to sit still: More than half the days  Becoming easily annoyed or irritable: Nearly every " day  Feeling afraid as if something awful might happen: Nearly every day  CONSTANZA 7 Total Score: 16  If you checked any problems, how difficult have these problems made it for you to do your work, take care of things at home, or get along with other people: Very difficult      MENTAL STATUS EXAM   General Appearance:  Cleanly groomed and dressed and well developed  Eye Contact:  Good eye contact  Attitude:  Cooperative and polite  Motor Activity:  Normal gait, posture  Muscle Strength:  Normal  Speech:  Normal rate, tone, volume  Language:  Stereotypical  Mood and affect:  Normal, pleasant, appropriate, mood congruent and euthymic  Hopelessness:  5  Loneliness: 8  Thought Process:  Logical and goal-directed  Associations/ Thought Content:  No delusions  Hallucinations:  None  Suicidal Ideations:  Not present  Homicidal Ideation:  Not present  Sensorium:  Alert and clear  Orientation:  Person, place, time and situation  Immediate Recall, Recent, and Remote Memory:  Intact  Attention Span/ Concentration:  Good  Fund of Knowledge:  Appropriate for age and educational level  Intellectual Functioning:  Average range  Insight:  Good  Judgement:  Good  Reliability:  Good  Impulse Control:  Good         Patient's Support Network Includes:  mother    Progress toward goal: Not at goal    Prognosis: Good with Ongoing Treatment     Plan: Patient will continue ongoing therapeutic mental health services to assist with stabilization and overall improved quality of life through the use of coping skills and psychoeducation.     Patient will continue in individual outpatient therapy with focus on improved functioning and coping skills, maintaining stability, and avoiding decompensation and the need for higher level of care.    Patient will adhere to medication regimen as prescribed and report any side effects. Patient will contact this office, call 911 or present to the nearest emergency room should suicidal or homicidal ideations occur.  Provide Cognitive Behavioral Therapy and Solution Focused Therapy to improve functioning, maintain stability, and avoid decompensation and the need for higher level of care.     Return in about 2 weeks (around 1/24/2025) for Video visit, Next scheduled follow up.      VISIT DIAGNOSIS:    Diagnosis Plan   1. Adjustment disorder with mixed anxiety and depressed mood                      This document has been electronically signed by SHIVAM Meneses  January 10, 2025      Part of this note may be an electronic transcription/translation of spoken language to printed text using the Dragon Dictation System.

## 2025-01-14 ENCOUNTER — HOSPITAL ENCOUNTER (OUTPATIENT)
Dept: CT IMAGING | Facility: HOSPITAL | Age: 45
Discharge: HOME | End: 2025-01-14
Admitting: NURSE PRACTITIONER
Payer: COMMERCIAL

## 2025-01-14 DIAGNOSIS — R07.9 RIGHT-SIDED CHEST PAIN: ICD-10-CM

## 2025-01-14 PROCEDURE — 71250 CT THORAX DX C-: CPT

## 2025-01-15 RX ORDER — VALSARTAN 80 MG/1
80 TABLET ORAL DAILY
Qty: 90 TABLET | Refills: 2 | Status: SHIPPED | OUTPATIENT
Start: 2025-01-15

## 2025-01-16 DIAGNOSIS — R91.1 PULMONARY NODULE: Primary | ICD-10-CM

## 2025-01-24 ENCOUNTER — TELEMEDICINE (OUTPATIENT)
Dept: PSYCHIATRY | Facility: CLINIC | Age: 45
End: 2025-01-24
Payer: COMMERCIAL

## 2025-01-24 DIAGNOSIS — F43.23 ADJUSTMENT DISORDER WITH MIXED ANXIETY AND DEPRESSED MOOD: Primary | ICD-10-CM

## 2025-01-24 RX ORDER — BUSPIRONE HYDROCHLORIDE 30 MG/1
30 TABLET ORAL 2 TIMES DAILY
Qty: 60 TABLET | Refills: 0 | Status: SHIPPED | OUTPATIENT
Start: 2025-01-24

## 2025-01-24 NOTE — PROGRESS NOTES
Mode of Visit: Video  Location of patient: -HOME-  Location of provider: +HOME+  You have chosen to receive care through a telehealth visit.  The patient has signed the video visit consent form.  The visit included audio and video interaction. No technical issues occurred during this visit.    Date: 2025  Time In: 09:59 am  Time out: 10:31 AM    This provider is located at [facility], [address] [place] [address], [facility], using a secure [employer] Video Visit through [employer]. Patient is being seen remotely via telehealth at their home address is located in [place]. Patient stated they are in a secure environment for this session. The patient's condition being diagnosed and treated is appropriate for telemedicine. The provider identified themself as well as their credentials. The patient, or  patient's legal guardian consent to be seen remotely, and when consent is given they understand that the consent allows for patient identifiable information to be sent to a third party as needed. They may refuse to be seen remotely at any time. The electronic data is encrypted and password protected, and the patient's or  legal guardian has been advised of the potential risks to privacy not withstanding such measures.   PT Identifiers used: Name and .    You have chosen to receive care through a telehealth visit.  Do you consent to use a video/audio connection for your medical care today? Yes    Subjective   Samina Maya is a 44 y.o. female who presents today for follow up    Chief Complaint:   Chief Complaint   Patient presents with    Anxiety    Depression        History of Present Illness:   Anxiety  Presents for follow-up visit.  Symptoms include decreased concentration, depressed mood, excessive worry, insomnia, irritability, malaise, muscle tension, nervous/anxious behavior, restlessness and malaise/fatigue. Symptoms occur most days. The severity of symptoms is interfering with  daily activities. The quality of sleep is non-restorative. Awakens often during the night. Her past medical history is significant for depression. Past treatments include SSRIs, non-benzodiazephine anxiolytics and lifestyle changes. The treatment provided mild relief. Compliance with medications is %.   Depression  Presents for follow-up visit. Symptoms include decreased concentration, depressed mood, excessive worry, feelings of hopelessness, insomnia, irritability, malaise, muscle tension, nervousness/anxiety, restlessness and malaise/fatigue. Symptoms occur most days.  The severity of symptoms is interfering with daily activities.  The quality of sleep is non-restorative. Awakens often during the night. Her past medical history is significant for depression. Past treatments include SSRI, non-benzodiazephine anxiolytics, medications and lifestyle changes. The treatment provides mild relief.          Clinical Maneuvering/Intervention:      Assisted patient in processing above session content; acknowledged and normalized patient’s thoughts, feelings, and concerns.  Rationalized patient thought process regarding concerns presented at session.  Discussed triggers associated with patient's  anxiety  and depression  Also discussed coping skills for patient to implement such as increasing activity  and self care     Allowed patient to freely discuss issues without interruption or judgment. Provided safe, confidential environment to facilitate the development of positive therapeutic relationship and encourage open, honest communication. Assisted patient in identifying risk factors which would indicate the need for higher level of care including thoughts to harm self or others and/or self-harming behavior and encouraged patient to contact this office, call 911, or present to the nearest emergency room should any of these events occur. Discussed crisis intervention services and means to access. Patient adamantly and  "convincingly denies current suicidal or homicidal ideation or perceptual disturbance.    Assessment: Patient reported no active suicidal ideations, self-injurious behaviors, or homicidal ideations.  Patient reported continued poor sleeping patterns.  Patient reported low appetite.  Therapist continued to use Rogerian focused interventions with patient to build rapport and to provide unconditional positive regard.  Patient and therapist continue to explore triggers for her anxiety and depression with patient identifying \"doing everything on my own right now, and having no kind of support group to help me\" as primary trigger.  Patient and therapist continue to explore appropriate coping strategies including involvement in an online support group to assist her in establishing relationships with others who are experiencing the same life event, and increasing daily self-care practices.  Patient requested short session today due to \"being up and down all night last night.\"    Patient appears to maintain relative stability as compared to their baseline.  However, patient continues to struggle with   Chief Complaint   Patient presents with    Anxiety    Depression    which continues to cause impairment in important areas of functioning.  A result, they can be reasonably expected to continue to benefit from treatment and would likely be at increased risk for decompensation otherwise.      PHQ-Score Total:  PHQ-9 Total Score: 18    CONSTANZA-7 Score Total:  Over the last two weeks, how often have you been bothered by the following problems?  Feeling nervous, anxious or on edge: Nearly every day  Not being able to stop or control worrying: Not at all  Worrying too much about different things: Nearly every day  Trouble Relaxing: Not at all  Being so restless that it is hard to sit still: More than half the days  Becoming easily annoyed or irritable: Nearly every day  Feeling afraid as if something awful might happen: Not at all  CONSTANZA " 7 Total Score: 11  If you checked any problems, how difficult have these problems made it for you to do your work, take care of things at home, or get along with other people: Somewhat difficult      MENTAL STATUS EXAM   General Appearance:  Cleanly groomed and dressed and well developed  Eye Contact:  Good eye contact  Attitude:  Cooperative and polite  Motor Activity:  Normal gait, posture  Muscle Strength:  Normal  Speech:  Normal rate, tone, volume  Language:  Stereotypical  Mood and affect:  Normal, pleasant, appropriate, mood congruent and euthymic  Hopelessness:  8  Loneliness: 9  Thought Process:  Logical and goal-directed  Associations/ Thought Content:  No delusions  Hallucinations:  None  Suicidal Ideations:  Not present  Homicidal Ideation:  Not present  Sensorium:  Alert and clear  Orientation:  Person, place, time and situation  Immediate Recall, Recent, and Remote Memory:  Intact  Attention Span/ Concentration:  Good  Fund of Knowledge:  Appropriate for age and educational level  Intellectual Functioning:  Average range  Insight:  Good  Judgement:  Good  Reliability:  Good  Impulse Control:  Good         Patient's Support Network Includes:  mother    Progress toward goal: Not at goal    Prognosis: Good with Ongoing Treatment     Plan: Patient will continue ongoing therapeutic mental health services to assist with stabilization and overall improved quality of life through the use of coping skills and psychoeducation.    Patient will continue in individual outpatient therapy with focus on improved functioning and coping skills, maintaining stability, and avoiding decompensation and the need for higher level of care.    Patient will adhere to medication regimen as prescribed and report any side effects. Patient will contact this office, call 911 or present to the nearest emergency room should suicidal or homicidal ideations occur. Provide Cognitive Behavioral Therapy and Solution Focused Therapy to  improve functioning, maintain stability, and avoid decompensation and the need for higher level of care.     Return in about 2 weeks (around 2/7/2025) for Video visit, Next scheduled follow up.      VISIT DIAGNOSIS:    Diagnosis Plan   1. Adjustment disorder with mixed anxiety and depressed mood                      This document has been electronically signed by SHIVAM Meneses  January 24, 2025      Part of this note may be an electronic transcription/translation of spoken language to printed text using the Dragon Dictation System.

## 2025-01-27 RX ORDER — ATORVASTATIN CALCIUM 40 MG/1
40 TABLET, FILM COATED ORAL NIGHTLY
Qty: 90 TABLET | Refills: 2 | Status: SHIPPED | OUTPATIENT
Start: 2025-01-27

## 2025-01-29 ENCOUNTER — OFFICE VISIT (OUTPATIENT)
Dept: CARDIOLOGY | Facility: CLINIC | Age: 45
End: 2025-01-29
Payer: COMMERCIAL

## 2025-01-29 VITALS
WEIGHT: 133 LBS | RESPIRATION RATE: 18 BRPM | TEMPERATURE: 98 F | HEART RATE: 71 BPM | HEIGHT: 66 IN | BODY MASS INDEX: 21.38 KG/M2 | OXYGEN SATURATION: 99 % | DIASTOLIC BLOOD PRESSURE: 66 MMHG | SYSTOLIC BLOOD PRESSURE: 116 MMHG

## 2025-01-29 DIAGNOSIS — Z72.0 TOBACCO ABUSE: ICD-10-CM

## 2025-01-29 DIAGNOSIS — I25.10 CORONARY ARTERY DISEASE INVOLVING NATIVE CORONARY ARTERY OF NATIVE HEART WITHOUT ANGINA PECTORIS: Primary | ICD-10-CM

## 2025-01-29 DIAGNOSIS — I10 ESSENTIAL HYPERTENSION: ICD-10-CM

## 2025-01-29 PROBLEM — R06.02 SOB (SHORTNESS OF BREATH): Status: RESOLVED | Noted: 2024-11-12 | Resolved: 2025-01-29

## 2025-01-29 PROCEDURE — 3074F SYST BP LT 130 MM HG: CPT | Performed by: INTERNAL MEDICINE

## 2025-01-29 PROCEDURE — 93000 ELECTROCARDIOGRAM COMPLETE: CPT | Performed by: INTERNAL MEDICINE

## 2025-01-29 PROCEDURE — 1159F MED LIST DOCD IN RCRD: CPT | Performed by: INTERNAL MEDICINE

## 2025-01-29 PROCEDURE — 99214 OFFICE O/P EST MOD 30 MIN: CPT | Performed by: INTERNAL MEDICINE

## 2025-01-29 PROCEDURE — 1160F RVW MEDS BY RX/DR IN RCRD: CPT | Performed by: INTERNAL MEDICINE

## 2025-01-29 PROCEDURE — 3078F DIAST BP <80 MM HG: CPT | Performed by: INTERNAL MEDICINE

## 2025-01-29 RX ORDER — VALSARTAN 320 MG/1
320 TABLET ORAL DAILY
Qty: 90 TABLET | Refills: 3 | Status: SHIPPED | OUTPATIENT
Start: 2025-01-29

## 2025-01-29 NOTE — PROGRESS NOTES
[facility]  [facility]  [address] [place] [address]  Dept: [phone]  Dept Fax: [phone]    Samina Maya  1980    Follow Up Office Visit Note    History of Present Illness:  Samina Maya is a 44 y.o. female who presents to the clinic for Follow-up.CAD- She denies any chest pain, doing good, on Asa prior MI and stent to RCA and has 50% LAD. EKG sinus HR 60 , will keep same approach, still smoking but has slow down    The following portions of the patient's history were reviewed and updated as appropriate: allergies, current medications, past family history, past medical history, past social history, past surgical history, and problem list.    Medications:  albuterol sulfate HFA  aspirin  atorvastatin  budesonide-formoterol  busPIRone  FLUoxetine  metoprolol tartrate  pantoprazole  valsartan    Subjective  Allergies   Allergen Reactions   • Lisinopril Swelling   • Morphine Nausea And Vomiting     Only pill form   • Doxycycline Nausea And Vomiting   • Aspirin Nausea And Vomiting   • Duloxetine Unknown - Low Severity     Skin high sensitive; felt like skin was crawling   • Hydrocodone Nausea And Vomiting     HA   • Methadone Nausea And Vomiting   • Pregabalin Swelling        Past Medical History:   Diagnosis Date   • ADHD (attention deficit hyperactivity disorder) 2006   • Allergic    • Anxiety 2006   • Arthritis of back 2013   • Asthma 2018   • CHF (congestive heart failure) 2018   • Cholelithiasis     Removed 2013   • Coronary artery disease 2018   • CTS (carpal tunnel syndrome) 2018   • Depression 2006   • Dislocated elbow 2018   • Fracture of ankle 2023   • Fracture, foot 2009 & 2023    Right then left   • GERD (gastroesophageal reflux disease)    • Hip arthrosis 2013   • Hyperlipidemia 2018   • Hypertension 2018   • Knee swelling 2022   • Low back pain 2009   • Low back strain 2009   • Lumbosacral disc disease 2009   • Myocardial infarction  2018   • Osteopenia 2016   • Scoliosis 2009   • Tennis elbow 2018   • Urinary tract infection        Past Surgical History:   Procedure Laterality Date   • BACK SURGERY  2009   • CARPAL TUNNEL RELEASE Left 03/20/2024    Dr. Perez   • CHOLECYSTECTOMY  2013   • CORONARY STENT PLACEMENT  2018    2 stents   • SPINE SURGERY  2009   • TRIGGER POINT INJECTION  2023       Family History   Problem Relation Age of Onset   • Dementia Mother    • Anxiety disorder Mother    • Alcohol abuse Mother    • Cancer Mother    • Arthritis Mother    • Depression Mother    • Hyperlipidemia Mother    • Anxiety disorder Father    • Diabetes Father    • Severe sprains Father    • Arthritis Father    • Depression Father    • Anxiety disorder Maternal Aunt         Strokes and tumor in heart   • Depression Maternal Aunt    • Heart disease Maternal Aunt    • Stroke Maternal Aunt    • Dementia Maternal Grandfather    • Arthritis Maternal Grandfather    • COPD Maternal Grandfather    • Hyperlipidemia Maternal Grandfather    • Arthritis Maternal Grandmother    • Heart disease Maternal Grandmother    • Stroke Maternal Grandmother    • Cancer Paternal Grandmother    • ADD / ADHD Son    • Anxiety disorder Son         Adhd, Bipolar        Social History     Socioeconomic History   • Marital status: Single   • Number of children: 1   Tobacco Use   • Smoking status: Some Days     Current packs/day: 0.25     Average packs/day: 0.3 packs/day for 28.7 years (7.2 ttl pk-yrs)     Types: Cigarettes     Start date: 5/19/1996   • Smokeless tobacco: Never   • Tobacco comments:     Patient reported she is smoking around 3 cigarettes per day and is attempting to cut down to zero completely.    Vaping Use   • Vaping status: Never Used   Substance and Sexual Activity   • Alcohol use: Never   • Drug use: Yes     Types: Marijuana   • Sexual activity: Yes     Partners: Male     Birth control/protection: None, Depo-provera       Review of Systems   Constitutional:  "Negative.    HENT: Negative.     Respiratory: Negative.     Cardiovascular: Negative.    Endocrine: Negative.    Genitourinary: Negative.    Musculoskeletal: Negative.    Skin: Negative.    Allergic/Immunologic: Negative.    Neurological: Negative.    Hematological: Negative.    Psychiatric/Behavioral: Negative.       Cardiovascular Procedures    ECHO/MUGA:  STRESS TESTS:   CARDIAC CATH:   DEVICES:   HOLTER:   CT/MRI:   VASCULAR:   CARDIOTHORACIC:     Objective  Vitals:    01/29/25 0906   BP: 116/66   BP Location: Right arm   Patient Position: Lying   Cuff Size: Adult   Pulse: 71   Resp: 18   Temp: 98 °F (36.7 °C)   TempSrc: Infrared   SpO2: 99%   Weight: 60.3 kg (133 lb)   Height: 167.6 cm (66\")   PainSc: 0-No pain     Body mass index is 21.47 kg/m².     Physical Exam  Vitals reviewed.   Constitutional:       Appearance: Healthy appearance. Not in distress.   Neck:      Vascular: No JVR. JVD normal.   Pulmonary:      Effort: Pulmonary effort is normal.      Breath sounds: Normal breath sounds. No wheezing. No rhonchi. No rales.   Chest:      Chest wall: Not tender to palpatation.   Cardiovascular:      PMI at left midclavicular line. Normal rate. Regular rhythm. Normal S1. Normal S2.       Murmurs: There is no murmur.      No gallop.  No click. No rub.   Pulses:     Intact distal pulses.   Edema:     Peripheral edema absent.   Abdominal:      General: Bowel sounds are normal.      Palpations: Abdomen is soft.      Tenderness: There is no abdominal tenderness.   Musculoskeletal: Normal range of motion.         General: No tenderness. Skin:     General: Skin is warm and dry.   Neurological:      General: No focal deficit present.      Mental Status: Alert and oriented to person, place and time.        Diagnostic Data    ECG 12 Lead    Date/Time: 1/29/2025 9:39 AM  Performed by: Priyank Day MD    Authorized by: Priyank Day MD  Comparison: compared with previous ECG from 10/31/2024  Similar to " previous ECG  Rhythm: sinus rhythm  Rate: normal  BPM: 60  QRS axis: normal    Clinical impression: normal ECG        Assessment and Plan  Diagnoses and all orders for this visit:    Coronary artery disease involving native coronary artery of native heart without angina pectoris- No chest pain on Asa 81 mg    Essential hypertension- BP is 140.80 will increase Valsartan to 320 mg, keep Metoprolol 25 bid     Tobacco abuse- advised to q    Other orders  -     valsartan (DIOVAN) 320 MG tablet; Take 1 tablet by mouth Daily.         Return in about 6 months (around 7/29/2025) for Recheck with Dr. Day.    Priyank Day MD  01/29/2025

## 2025-02-05 ENCOUNTER — TELEMEDICINE (OUTPATIENT)
Dept: PSYCHIATRY | Facility: CLINIC | Age: 45
End: 2025-02-05
Payer: COMMERCIAL

## 2025-02-05 DIAGNOSIS — F43.23 ADJUSTMENT DISORDER WITH MIXED ANXIETY AND DEPRESSED MOOD: Primary | ICD-10-CM

## 2025-02-05 NOTE — PROGRESS NOTES
Mode of Visit: Video  Location of patient: -HOME-  Location of provider: +HOME+  You have chosen to receive care through a telehealth visit.  The patient has signed the video visit consent form.  The visit included audio and video interaction. No technical issues occurred during this visit.    Date: 2025  Time In: 09:58 AM  Time out: 10:39 AM    This provider is located at [facility], [address] [place] [address], [facility], using a secure [employer] Video Visit through [employer]. Patient is being seen remotely via telehealth at their home address is located in [place]. Patient stated they are in a secure environment for this session. The patient's condition being diagnosed and treated is appropriate for telemedicine. The provider identified themself as well as their credentials. The patient, or  patient's legal guardian consent to be seen remotely, and when consent is given they understand that the consent allows for patient identifiable information to be sent to a third party as needed. They may refuse to be seen remotely at any time. The electronic data is encrypted and password protected, and the patient's or  legal guardian has been advised of the potential risks to privacy not withstanding such measures.   PT Identifiers used: Name and .    You have chosen to receive care through a telehealth visit.  Do you consent to use a video/audio connection for your medical care today? Yes    Subjective   Samina Maya is a 44 y.o. female who presents today for follow up    Chief Complaint:   Chief Complaint   Patient presents with    Anxiety    Depression        History of Present Illness:   Anxiety  Presents for follow-up visit.  Symptoms include decreased concentration, depressed mood, excessive worry, insomnia, irritability, malaise, muscle tension, nervous/anxious behavior, restlessness and malaise/fatigue. Symptoms occur most days. The severity of symptoms is interfering with  daily activities. The quality of sleep is non-restorative. Awakens often during the night. Her past medical history is significant for depression. Past treatments include SSRIs, non-benzodiazephine anxiolytics, lifestyle changes and counseling (CBT). The treatment provided moderate relief. Compliance with medications is %.   Depression  Presents for follow-up visit. Symptoms include decreased concentration, depressed mood, excessive worry, insomnia, irritability, malaise, muscle tension, nervousness/anxiety, restlessness and malaise/fatigue. Symptoms occur most days.  The severity of symptoms is interfering with daily activities.  The quality of sleep is non-restorative. Awakens often during the night. Her past medical history is significant for depression. Past treatments include SSRI, non-benzodiazephine anxiolytics, lifestyle changes, medications and individual therapy. The treatment provides moderate relief. Compliance with medications is %.          Clinical Maneuvering/Intervention:      Assisted patient in processing above session content; acknowledged and normalized patient’s thoughts, feelings, and concerns.  Rationalized patient thought process regarding concerns presented at session.  Discussed triggers associated with patient's  anxiety  and depression  Also discussed coping skills for patient to implement such as increasing activity , self care , and positive self talk     Allowed patient to freely discuss issues without interruption or judgment. Provided safe, confidential environment to facilitate the development of positive therapeutic relationship and encourage open, honest communication. Assisted patient in identifying risk factors which would indicate the need for higher level of care including thoughts to harm self or others and/or self-harming behavior and encouraged patient to contact this office, call 911, or present to the nearest emergency room should any of these events occur.  Discussed crisis intervention services and means to access. Patient adamantly and convincingly denies current suicidal or homicidal ideation or perceptual disturbance.    Assessment: Patient reported no active suicidal ideation, self-injurious behaviors, or homicidal ideations.  Patient reported continued sporadic sleeping patterns.  Patient reported average appetite.  Therapist continued to use Rogerian focused interventions with patient to build rapport and to provide unconditional positive regard.  Patient and therapist continue to explore ongoing triggers for anxiety and depression with patient discussing stress of caring for her mother and grandmother who are both diagnosed with cancer, trying to find a job, and the continued grief of losing her son as primary triggers at this time.  Patient and therapist continue to explore appropriate coping strategies including positive daily self affirmations, implementation of daily self-care, identification and replacement of negative thought processes through the use of cognitive behavioral strategies, and increase in pleasurable activities.    Patient appears to maintain relative stability as compared to their baseline.  However, patient continues to struggle with   Chief Complaint   Patient presents with    Anxiety    Depression    which continues to cause impairment in important areas of functioning.  A result, they can be reasonably expected to continue to benefit from treatment and would likely be at increased risk for decompensation otherwise.      PHQ-Score Total:  PHQ-9 Total Score: 12    CONSTANZA-7 Score Total:  Over the last two weeks, how often have you been bothered by the following problems?  Feeling nervous, anxious or on edge: Nearly every day  Not being able to stop or control worrying: Nearly every day  Worrying too much about different things: More than half the days  Trouble Relaxing: Several days  Being so restless that it is hard to sit still: Nearly  every day  Becoming easily annoyed or irritable: More than half the days  Feeling afraid as if something awful might happen: Not at all  CONSTANZA 7 Total Score: 14  If you checked any problems, how difficult have these problems made it for you to do your work, take care of things at home, or get along with other people: Very difficult      MENTAL STATUS EXAM   General Appearance:  Cleanly groomed and dressed and well developed  Eye Contact:  Good eye contact  Attitude:  Cooperative and polite  Motor Activity:  Normal gait, posture  Muscle Strength:  Normal  Speech:  Normal rate, tone, volume  Language:  Stereotypical  Mood and affect:  Normal, pleasant, appropriate, mood congruent and euthymic  Hopelessness:  Denies  Loneliness: Denies  Thought Process:  Logical and goal-directed  Associations/ Thought Content:  No delusions  Hallucinations:  None  Suicidal Ideations:  Not present  Homicidal Ideation:  Not present  Sensorium:  Alert and clear  Orientation:  Person, place, time and situation  Immediate Recall, Recent, and Remote Memory:  Intact  Attention Span/ Concentration:  Good  Fund of Knowledge:  Appropriate for age and educational level  Intellectual Functioning:  Average range  Insight:  Good  Judgement:  Good  Reliability:  Good  Impulse Control:  Good         Patient's Support Network Includes:  mother    Progress toward goal: Not at goal    Prognosis: Good with Ongoing Treatment     Plan: Patient will continue ongoing therapeutic mental health services to assist with stabilization and overall improved quality of life through the use of coping skills and psychoeducation.    Patient will continue in individual outpatient therapy with focus on improved functioning and coping skills, maintaining stability, and avoiding decompensation and the need for higher level of care.    Patient will adhere to medication regimen as prescribed and report any side effects. Patient will contact this office, call 911 or present to  the nearest emergency room should suicidal or homicidal ideations occur. Provide Cognitive Behavioral Therapy and Solution Focused Therapy to improve functioning, maintain stability, and avoid decompensation and the need for higher level of care.     Return in about 2 weeks (around 2/19/2025) for Video visit, Next scheduled follow up.      VISIT DIAGNOSIS:    Diagnosis Plan   1. Adjustment disorder with mixed anxiety and depressed mood                      This document has been electronically signed by SHIVAM Meneses  February 5, 2025      Part of this note may be an electronic transcription/translation of spoken language to printed text using the Dragon Dictation System.

## 2025-02-11 ENCOUNTER — OFFICE VISIT (OUTPATIENT)
Dept: FAMILY MEDICINE CLINIC | Facility: CLINIC | Age: 45
End: 2025-02-11
Payer: COMMERCIAL

## 2025-02-11 VITALS
DIASTOLIC BLOOD PRESSURE: 78 MMHG | HEIGHT: 65 IN | BODY MASS INDEX: 20.91 KG/M2 | SYSTOLIC BLOOD PRESSURE: 110 MMHG | WEIGHT: 125.5 LBS | TEMPERATURE: 99.3 F | OXYGEN SATURATION: 93 % | HEART RATE: 63 BPM

## 2025-02-11 DIAGNOSIS — J45.41 MODERATE PERSISTENT ASTHMA WITH EXACERBATION: ICD-10-CM

## 2025-02-11 DIAGNOSIS — K14.0 GLOSSITIS: ICD-10-CM

## 2025-02-11 DIAGNOSIS — B37.0 THRUSH: ICD-10-CM

## 2025-02-11 DIAGNOSIS — J02.9 SORE THROAT: Primary | ICD-10-CM

## 2025-02-11 LAB
EXPIRATION DATE: NORMAL
EXPIRATION DATE: NORMAL
FLUAV AG UPPER RESP QL IA.RAPID: NOT DETECTED
FLUBV AG UPPER RESP QL IA.RAPID: NOT DETECTED
INTERNAL CONTROL: NORMAL
INTERNAL CONTROL: NORMAL
Lab: NORMAL
Lab: NORMAL
S PYO AG THROAT QL: NEGATIVE
SARS-COV-2 AG UPPER RESP QL IA.RAPID: NOT DETECTED

## 2025-02-11 PROCEDURE — 87428 SARSCOV & INF VIR A&B AG IA: CPT | Performed by: FAMILY MEDICINE

## 2025-02-11 PROCEDURE — 99213 OFFICE O/P EST LOW 20 MIN: CPT | Performed by: FAMILY MEDICINE

## 2025-02-11 PROCEDURE — 1159F MED LIST DOCD IN RCRD: CPT | Performed by: FAMILY MEDICINE

## 2025-02-11 PROCEDURE — 1160F RVW MEDS BY RX/DR IN RCRD: CPT | Performed by: FAMILY MEDICINE

## 2025-02-11 PROCEDURE — 3078F DIAST BP <80 MM HG: CPT | Performed by: FAMILY MEDICINE

## 2025-02-11 PROCEDURE — 87880 STREP A ASSAY W/OPTIC: CPT | Performed by: FAMILY MEDICINE

## 2025-02-11 PROCEDURE — 3074F SYST BP LT 130 MM HG: CPT | Performed by: FAMILY MEDICINE

## 2025-02-11 PROCEDURE — 1126F AMNT PAIN NOTED NONE PRSNT: CPT | Performed by: FAMILY MEDICINE

## 2025-02-11 RX ORDER — FLUCONAZOLE 200 MG/1
TABLET ORAL
Qty: 4 TABLET | Refills: 0 | Status: SHIPPED | OUTPATIENT
Start: 2025-02-11 | End: 2025-02-18

## 2025-02-11 RX ORDER — PREDNISONE 20 MG/1
TABLET ORAL
Qty: 15 TABLET | Refills: 0 | Status: SHIPPED | OUTPATIENT
Start: 2025-02-11 | End: 2025-02-21

## 2025-02-11 RX ORDER — NYSTATIN 100000 [USP'U]/ML
500000 SUSPENSION ORAL 4 TIMES DAILY
Qty: 240 ML | Refills: 0 | Status: SHIPPED | OUTPATIENT
Start: 2025-02-11

## 2025-02-11 RX ORDER — AZITHROMYCIN 250 MG/1
TABLET, FILM COATED ORAL
Qty: 6 TABLET | Refills: 0 | Status: SHIPPED | OUTPATIENT
Start: 2025-02-11

## 2025-02-11 NOTE — PROGRESS NOTES
"Chief Complaint  Sore Throat (Sore tongue  blisters on tongue )    Subjective      Samina Maya presents to [facility]  Sore Throat       Patient says that she is here because she has had significant soreness in the base of her tongue and the lower part of her throat that started about 6 days ago.  She says she has not felt ill, so there is been no fever or chills or coryza.  She denies any vomiting or diarrhea.  She denies any heartburn.  The patient does say that she feels more congested in her chest.  She reports a history of asthma and feels like she is having an exacerbation.  Objective   Vital Signs:   Vitals:    02/11/25 1412   BP: 110/78   BP Location: Left arm   Patient Position: Sitting   Cuff Size: Adult   Pulse: 63   Temp: 99.3 °F (37.4 °C)   TempSrc: Oral   SpO2: 93%   Weight: 56.9 kg (125 lb 8 oz)   Height: 165.1 cm (65\")      /78 (BP Location: Left arm, Patient Position: Sitting, Cuff Size: Adult)   Pulse 63   Temp 99.3 °F (37.4 °C) (Oral)   Ht 165.1 cm (65\")   Wt 56.9 kg (125 lb 8 oz)   SpO2 93%   BMI 20.88 kg/m²     Body mass index is 20.88 kg/m².    Review of Systems   HENT:  Positive for sore throat.        Past History:  Medical History: has a past medical history of ADHD (attention deficit hyperactivity disorder) (2006), Allergic, Anxiety (2006), Arthritis of back (2013), Asthma (2018), CHF (congestive heart failure) (2018), Cholelithiasis, Coronary artery disease (2018), CTS (carpal tunnel syndrome) (2018), Depression (2006), Dislocated elbow (2018), Fracture of ankle (2023), Fracture, foot (2009 & 2023), GERD (gastroesophageal reflux disease), Hip arthrosis (2013), Hyperlipidemia (2018), Hypertension (2018), Knee swelling (2022), Low back pain (2009), Low back strain (2009), Lumbosacral disc disease (2009), Myocardial infarction (2018), Osteopenia (2016), Scoliosis (2009), Tennis elbow (2018), and Urinary tract infection.   Surgical History: has " a past surgical history that includes Back surgery (2009); Trigger point injection (2023); Carpal tunnel release (Left, 03/20/2024); Cholecystectomy (2013); Coronary stent placement (2018); and Spine surgery (2009).   Family History: family history includes ADD / ADHD in her son; Alcohol abuse in her mother; Anxiety disorder in her father, maternal aunt, mother, and son; Arthritis in her father, maternal grandfather, maternal grandmother, and mother; COPD in her maternal grandfather; Cancer in her mother and paternal grandmother; Dementia in her maternal grandfather and mother; Depression in her father, maternal aunt, and mother; Diabetes in her father; Heart disease in her maternal aunt and maternal grandmother; Hyperlipidemia in her maternal grandfather and mother; Severe sprains in her father; Stroke in her maternal aunt and maternal grandmother.   Social History: reports that she has been smoking cigarettes. She started smoking about 28 years ago. She has a 7.2 pack-year smoking history. She has never used smokeless tobacco. She reports current drug use. Drug: Marijuana. She reports that she does not drink alcohol.      Current Outpatient Medications:     albuterol sulfate  (90 Base) MCG/ACT inhaler, Inhale 2 puffs Every 4 (Four) Hours As Needed for Wheezing., Disp: 18 g, Rfl: 11    aspirin 81 MG EC tablet, Take 1 tablet by mouth Daily., Disp: 90 tablet, Rfl: 3    atorvastatin (LIPITOR) 40 MG tablet, Take 1 tablet by mouth Every Night., Disp: 90 tablet, Rfl: 2    budesonide-formoterol (Symbicort) 160-4.5 MCG/ACT inhaler, Inhale 2 puffs 2 (Two) Times a Day., Disp: 10.2 g, Rfl: 12    busPIRone (BUSPAR) 30 MG tablet, Take 1 tablet by mouth 2 (Two) Times a Day., Disp: 60 tablet, Rfl: 0    FLUoxetine (PROzac) 20 MG capsule, TAKE THREE CAPSULES BY MOUTH DAILY, Disp: 90 capsule, Rfl: 0    metoprolol tartrate (LOPRESSOR) 25 MG tablet, Take 1 tablet by mouth 2 (Two) Times a Day., Disp: 180 tablet, Rfl: 2     pantoprazole (PROTONIX) 20 MG EC tablet, Take 1 tablet by mouth Daily., Disp: 90 tablet, Rfl: 3    valsartan (DIOVAN) 320 MG tablet, Take 1 tablet by mouth Daily., Disp: 90 tablet, Rfl: 3    azithromycin (Zithromax Z-Luis Daniel) 250 MG tablet, Take 2 tablets by mouth on day 1, then 1 tablet daily on days 2-5, Disp: 6 tablet, Rfl: 0    fluconazole (DIFLUCAN) 200 MG tablet, Take 1 tablet by mouth Daily for 1 day, THEN 0.5 tablets Daily for 6 days. HOLD THE ATORVASTATIN WHILE TAKING, Disp: 4 tablet, Rfl: 0    nystatin (MYCOSTATIN) 100,000 unit/mL suspension, Swish and swallow 5 mL 4 (Four) Times a Day., Disp: 240 mL, Rfl: 0    predniSONE (DELTASONE) 20 MG tablet, Take 2 tablets by mouth Daily for 5 days, THEN 1 tablet Daily for 5 days., Disp: 15 tablet, Rfl: 0    Allergies: Lisinopril, Morphine, Doxycycline, Aspirin, Duloxetine, Hydrocodone, Methadone, and Pregabalin    Physical Exam  Constitutional:       General: She is not in acute distress.     Appearance: She is not ill-appearing, toxic-appearing or diaphoretic.   HENT:      Head: Normocephalic.      Right Ear: Tympanic membrane, ear canal and external ear normal.      Left Ear: Tympanic membrane, ear canal and external ear normal.      Nose: Nose normal. No congestion or rhinorrhea.      Mouth/Throat:      Mouth: Mucous membranes are moist.      Pharynx: Posterior oropharyngeal erythema present.      Comments: There is erythema noted of the lower posterior pharynx and the bilateral buccal mucosa, although mild, and some inflammation of the tongue with evidence of thrush on the tongue and posterior pharynx.  The patient does have laryngitis, is hoarse  Eyes:      General: No scleral icterus.        Right eye: No discharge.         Left eye: No discharge.      Conjunctiva/sclera: Conjunctivae normal.      Pupils: Pupils are equal, round, and reactive to light.   Cardiovascular:      Rate and Rhythm: Normal rate and regular rhythm.      Pulses: Normal pulses.      Heart  sounds: Normal heart sounds.   Pulmonary:      Effort: Pulmonary effort is normal.      Breath sounds: No stridor. Wheezing and rhonchi present. No rales.   Musculoskeletal:      Cervical back: Normal range of motion. No tenderness.      Right lower leg: No edema.      Left lower leg: No edema.   Lymphadenopathy:      Cervical: No cervical adenopathy.   Skin:     General: Skin is warm and dry.      Capillary Refill: Capillary refill takes less than 2 seconds.      Findings: No erythema or rash.   Neurological:      Mental Status: She is alert.                   Assessment and Plan   Diagnoses and all orders for this visit:    1. Sore throat (Primary)  -     POC Rapid Strep A  -     Covid-19 + Flu A&B AG, Veritor  Swabs for strep COVID and flu were all negative.  Initially the patient appeared to have thrush on the back of the throat and the tongue, with a milder case on the bucca mucosa, but with auscultation of the lungs she does sound like she is having an asthma exacerbation as well.  She is on Symbicort, we discussed the fact that inhaled corticosteroids can cause thrush.  However, it would be best if she can continue using this with good oral hygiene and careful rinsing of the mouth after using the Symbicort.  I will treat her with a course of prednisone and a Z-Luis Daniel for her apparent asthma exacerbation, but the patient says that she often gets vaginal yeast infections with this type of treatment, and since the thrush has been very bad, I told her that we would more than likely need to go ahead and treat her with oral Diflucan for a week, but that she would need to hold her atorvastatin while taking this.  In order to help improve symptoms a bit more quickly I will also give her nystatin oral suspension.  If her symptoms persist or worsen, then she will return or go to the emergency department  2. Glossitis    3. Thrush    4. Moderate persistent asthma with exacerbation    Other orders  -     predniSONE  (DELTASONE) 20 MG tablet; Take 2 tablets by mouth Daily for 5 days, THEN 1 tablet Daily for 5 days.  Dispense: 15 tablet; Refill: 0  -     azithromycin (Zithromax Z-Luis Daniel) 250 MG tablet; Take 2 tablets by mouth on day 1, then 1 tablet daily on days 2-5  Dispense: 6 tablet; Refill: 0  -     fluconazole (DIFLUCAN) 200 MG tablet; Take 1 tablet by mouth Daily for 1 day, THEN 0.5 tablets Daily for 6 days. HOLD THE ATORVASTATIN WHILE TAKING  Dispense: 4 tablet; Refill: 0  -     nystatin (MYCOSTATIN) 100,000 unit/mL suspension; Swish and swallow 5 mL 4 (Four) Times a Day.  Dispense: 240 mL; Refill: 0            Follow Up   Return if symptoms worsen or fail to improve.  Patient was given instructions and counseling regarding her condition or for health maintenance advice. Please see specific information pulled into the AVS if appropriate.     Johnny Lyn MD

## 2025-02-20 ENCOUNTER — TELEMEDICINE (OUTPATIENT)
Dept: PSYCHIATRY | Facility: CLINIC | Age: 45
End: 2025-02-20
Payer: OTHER MISCELLANEOUS

## 2025-02-20 DIAGNOSIS — F43.23 ADJUSTMENT DISORDER WITH MIXED ANXIETY AND DEPRESSED MOOD: Primary | ICD-10-CM

## 2025-02-21 RX ORDER — BUSPIRONE HYDROCHLORIDE 30 MG/1
30 TABLET ORAL 2 TIMES DAILY
Qty: 60 TABLET | Refills: 0 | Status: SHIPPED | OUTPATIENT
Start: 2025-02-21

## 2025-02-21 NOTE — TELEPHONE ENCOUNTER
Pt is due for appt, please call pt and schedule appt with PCP. Rx sent x 30 days until seen in office for further refills.

## 2025-02-22 NOTE — TELEPHONE ENCOUNTER
Called and spoke to patient. Patient agreed to make an appointment for March 4 with pcp for further refills

## 2025-03-04 ENCOUNTER — OFFICE VISIT (OUTPATIENT)
Dept: FAMILY MEDICINE CLINIC | Facility: CLINIC | Age: 45
End: 2025-03-04
Payer: COMMERCIAL

## 2025-03-04 ENCOUNTER — TELEPHONE (OUTPATIENT)
Dept: FAMILY MEDICINE CLINIC | Facility: CLINIC | Age: 45
End: 2025-03-04

## 2025-03-04 VITALS
SYSTOLIC BLOOD PRESSURE: 122 MMHG | OXYGEN SATURATION: 98 % | HEIGHT: 65 IN | HEART RATE: 58 BPM | WEIGHT: 129.06 LBS | DIASTOLIC BLOOD PRESSURE: 64 MMHG | BODY MASS INDEX: 21.5 KG/M2

## 2025-03-04 DIAGNOSIS — I25.10 CORONARY ARTERY DISEASE INVOLVING NATIVE CORONARY ARTERY OF NATIVE HEART WITHOUT ANGINA PECTORIS: ICD-10-CM

## 2025-03-04 DIAGNOSIS — R51.9 FREQUENT HEADACHES: ICD-10-CM

## 2025-03-04 DIAGNOSIS — E78.2 MIXED HYPERLIPIDEMIA: ICD-10-CM

## 2025-03-04 DIAGNOSIS — Z79.899 ENCOUNTER FOR LONG-TERM (CURRENT) USE OF MEDICATIONS: ICD-10-CM

## 2025-03-04 DIAGNOSIS — Z72.0 TOBACCO ABUSE: ICD-10-CM

## 2025-03-04 DIAGNOSIS — R41.3 MEMORY CHANGE: ICD-10-CM

## 2025-03-04 DIAGNOSIS — I25.2 HISTORY OF MI (MYOCARDIAL INFARCTION): ICD-10-CM

## 2025-03-04 DIAGNOSIS — Z12.31 ENCOUNTER FOR SCREENING MAMMOGRAM FOR MALIGNANT NEOPLASM OF BREAST: ICD-10-CM

## 2025-03-04 DIAGNOSIS — F41.9 ANXIETY: ICD-10-CM

## 2025-03-04 DIAGNOSIS — R10.9 RIGHT SIDED ABDOMINAL PAIN: ICD-10-CM

## 2025-03-04 DIAGNOSIS — Z87.09 HISTORY OF ASTHMA: ICD-10-CM

## 2025-03-04 DIAGNOSIS — M54.50 LUMBAR PAIN: ICD-10-CM

## 2025-03-04 DIAGNOSIS — R91.1 PULMONARY NODULE: ICD-10-CM

## 2025-03-04 DIAGNOSIS — Z12.4 SCREENING FOR CERVICAL CANCER: ICD-10-CM

## 2025-03-04 DIAGNOSIS — G56.03 BILATERAL CARPAL TUNNEL SYNDROME: ICD-10-CM

## 2025-03-04 DIAGNOSIS — K21.9 GASTROESOPHAGEAL REFLUX DISEASE, UNSPECIFIED WHETHER ESOPHAGITIS PRESENT: ICD-10-CM

## 2025-03-04 DIAGNOSIS — R22.0 MASS OF FACE: ICD-10-CM

## 2025-03-04 DIAGNOSIS — Z00.00 ANNUAL PHYSICAL EXAM: Primary | ICD-10-CM

## 2025-03-04 DIAGNOSIS — R82.90 NONSPECIFIC FINDING ON EXAMINATION OF URINE: ICD-10-CM

## 2025-03-04 DIAGNOSIS — Z80.0 FAMILY HISTORY OF COLON CANCER: ICD-10-CM

## 2025-03-04 DIAGNOSIS — Z13.1 SCREENING FOR DIABETES MELLITUS: ICD-10-CM

## 2025-03-04 DIAGNOSIS — I10 ESSENTIAL HYPERTENSION: ICD-10-CM

## 2025-03-04 DIAGNOSIS — E55.9 VITAMIN D DEFICIENCY: ICD-10-CM

## 2025-03-04 DIAGNOSIS — R94.2 RESTRICTIVE PATTERN PRESENT ON PULMONARY FUNCTION TESTING: ICD-10-CM

## 2025-03-04 PROBLEM — M54.2 NECK PAIN: Status: RESOLVED | Noted: 2024-02-19 | Resolved: 2025-03-04

## 2025-03-04 PROBLEM — R20.2 PARESTHESIA: Status: RESOLVED | Noted: 2024-02-19 | Resolved: 2025-03-04

## 2025-03-04 PROBLEM — J01.00 ACUTE NON-RECURRENT MAXILLARY SINUSITIS: Status: RESOLVED | Noted: 2024-07-17 | Resolved: 2025-03-04

## 2025-03-04 PROBLEM — Z11.59 NEED FOR HEPATITIS C SCREENING TEST: Status: RESOLVED | Noted: 2024-02-29 | Resolved: 2025-03-04

## 2025-03-04 PROBLEM — R22.32 MASS OF LEFT WRIST: Status: RESOLVED | Noted: 2024-02-19 | Resolved: 2025-03-04

## 2025-03-04 PROBLEM — R07.9 RIGHT-SIDED CHEST PAIN: Status: RESOLVED | Noted: 2024-10-24 | Resolved: 2025-03-04

## 2025-03-04 LAB
B-HCG UR QL: NEGATIVE
BILIRUB BLD-MCNC: ABNORMAL MG/DL
CLARITY, POC: CLEAR
COLOR UR: YELLOW
EXPIRATION DATE: ABNORMAL
EXPIRATION DATE: NORMAL
GLUCOSE BLDC GLUCOMTR-MCNC: 118 MG/DL (ref 70–130)
GLUCOSE UR STRIP-MCNC: NEGATIVE MG/DL
INTERNAL NEGATIVE CONTROL: NORMAL
INTERNAL POSITIVE CONTROL: NORMAL
KETONES UR QL: NEGATIVE
LEUKOCYTE EST, POC: ABNORMAL
Lab: ABNORMAL
Lab: NORMAL
NITRITE UR-MCNC: POSITIVE MG/ML
PH UR: 6 [PH] (ref 5–8)
PROT UR STRIP-MCNC: ABNORMAL MG/DL
RBC # UR STRIP: ABNORMAL /UL
SP GR UR: 1.02 (ref 1–1.03)
UROBILINOGEN UR QL: NORMAL

## 2025-03-04 RX ORDER — CIPROFLOXACIN 500 MG/1
500 TABLET, FILM COATED ORAL 2 TIMES DAILY
Qty: 20 TABLET | Refills: 0 | Status: SHIPPED | OUTPATIENT
Start: 2025-03-04

## 2025-03-04 RX ORDER — PREDNISONE 20 MG/1
TABLET ORAL
Qty: 18 TABLET | Refills: 0 | Status: SHIPPED | OUTPATIENT
Start: 2025-03-04 | End: 2025-03-04

## 2025-03-04 RX ORDER — BUSPIRONE HYDROCHLORIDE 30 MG/1
30 TABLET ORAL 2 TIMES DAILY
Qty: 180 TABLET | Refills: 1 | Status: SHIPPED | OUTPATIENT
Start: 2025-03-04

## 2025-03-04 NOTE — ASSESSMENT & PLAN NOTE
Lumbar and right hip x-ray completed.  Tylenol and ibuprofen as needed for pain.  We had discussed possibly doing a round of prednisone but will hold off for the time being since we are having to treat her with Cipro as those medications sometimes do not interact with each other well.  Limit picking up heavy objects and proper body mechanics discussed and encouraged.  Rest and ice in 15-minute intervals encouraged.  Risk of meds discussed and understood.  Education provided.  Return in 1 week if no improvement, sooner if worsens.  Return to clinic or ED with any issues or concerns.

## 2025-03-04 NOTE — ASSESSMENT & PLAN NOTE
Labs drawn.  UA completed.  Will place neurology referral for further evaluation.  Go to ED if worsens.  Return to clinic or ED with any issues or concerns.
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
## 2025-03-04 NOTE — ASSESSMENT & PLAN NOTE
Labs drawn.  UA completed.  UA shows bilirubin blood protein nitrates and leuks.  Culture sent.  Call in 2 days for results.  Due to this finding and her symptoms we will treat with a round of Cipro.  Stay hydrated with water.  Return in 1 to 2 weeks for repeat UA to make sure clears.  CT abdomen pelvis ordered.  Informed her with her insurance it will take a few days to get it approved so if anything worsens in the meantime she is to go to the emergency department.  UA completed.  Pregnancy test negative today.  Education provided.  Go to ED if worsens.  Return to clinic or ED with any issues or concerns.  Call after imaging for results.

## 2025-03-04 NOTE — ASSESSMENT & PLAN NOTE
Labs drawn.  UA completed.  Stay hydrated with water.  Will order CT scan of head for further evaluation.  Education provided on emergent signs symptoms go to hospital if occurs.  Will place neurology referral for further evaluation.  Tylenol as needed for pain.  Education provided.  Return to clinic or ED with any issues or concerns.

## 2025-03-04 NOTE — ASSESSMENT & PLAN NOTE
Patient denies colonoscopy at this time.  She states she will consider Cologuard in the near future.  States she will contact me and let me know when she wants to have that ordered.  Stressed the importance of her doing these.

## 2025-03-04 NOTE — ASSESSMENT & PLAN NOTE
Labs drawn.  UA completed.  Goal blood pressure less than 140/90.  Let me know if gets to or above that.  Continue to follow-up with cardiology and pulmonary.  PHQ-9 completed and discussed.  No thoughts of suicide or hurting himself or anyone else.  Proper diet and exercise plan discussed and encouraged.  Annual dental and eye exams encouraged.  Will place order for mammogram.  She knows she will be due a colonoscopy or Cologuard soon and she denies at this time and states when she turns 45 she will consider it and let me know when she wants to have it ordered.  Encouraged her to discuss Pap smears with gynecology.  Smoking cessation discussed and encouraged.  Denies flu and COVID vaccines.  Risk of meds discussed and understood.  Education provided.  Return to clinic or ED with any issues or concerns.

## 2025-03-05 LAB
25(OH)D3+25(OH)D2 SERPL-MCNC: 27.4 NG/ML (ref 30–100)
ALBUMIN SERPL-MCNC: 4.1 G/DL (ref 3.9–4.9)
ALP SERPL-CCNC: 90 IU/L (ref 44–121)
ALT SERPL-CCNC: 61 IU/L (ref 0–32)
AST SERPL-CCNC: 91 IU/L (ref 0–40)
BASOPHILS # BLD AUTO: 0 X10E3/UL (ref 0–0.2)
BASOPHILS NFR BLD AUTO: 0 %
BILIRUB SERPL-MCNC: 0.4 MG/DL (ref 0–1.2)
BUN SERPL-MCNC: 11 MG/DL (ref 6–24)
BUN/CREAT SERPL: 13 (ref 9–23)
CALCIUM SERPL-MCNC: 9 MG/DL (ref 8.7–10.2)
CHLORIDE SERPL-SCNC: 103 MMOL/L (ref 96–106)
CO2 SERPL-SCNC: 23 MMOL/L (ref 20–29)
CREAT SERPL-MCNC: 0.83 MG/DL (ref 0.57–1)
EGFRCR SERPLBLD CKD-EPI 2021: 89 ML/MIN/1.73
EOSINOPHIL # BLD AUTO: 0.2 X10E3/UL (ref 0–0.4)
EOSINOPHIL NFR BLD AUTO: 2 %
ERYTHROCYTE [DISTWIDTH] IN BLOOD BY AUTOMATED COUNT: 15.1 % (ref 11.7–15.4)
GLOBULIN SER CALC-MCNC: 2.7 G/DL (ref 1.5–4.5)
GLUCOSE SERPL-MCNC: 74 MG/DL (ref 70–99)
HBA1C MFR BLD: 6 % (ref 4.8–5.6)
HCT VFR BLD AUTO: 40.6 % (ref 34–46.6)
HGB BLD-MCNC: 13 G/DL (ref 11.1–15.9)
IMM GRANULOCYTES # BLD AUTO: 0 X10E3/UL (ref 0–0.1)
IMM GRANULOCYTES NFR BLD AUTO: 0 %
LYMPHOCYTES # BLD AUTO: 2.4 X10E3/UL (ref 0.7–3.1)
LYMPHOCYTES NFR BLD AUTO: 24 %
MAGNESIUM SERPL-MCNC: 2.1 MG/DL (ref 1.6–2.3)
MCH RBC QN AUTO: 28.4 PG (ref 26.6–33)
MCHC RBC AUTO-ENTMCNC: 32 G/DL (ref 31.5–35.7)
MCV RBC AUTO: 89 FL (ref 79–97)
MONOCYTES # BLD AUTO: 1.2 X10E3/UL (ref 0.1–0.9)
MONOCYTES NFR BLD AUTO: 12 %
NEUTROPHILS # BLD AUTO: 6.3 X10E3/UL (ref 1.4–7)
NEUTROPHILS NFR BLD AUTO: 62 %
PLATELET # BLD AUTO: 322 X10E3/UL (ref 150–450)
POTASSIUM SERPL-SCNC: 4.5 MMOL/L (ref 3.5–5.2)
PROT SERPL-MCNC: 6.8 G/DL (ref 6–8.5)
RBC # BLD AUTO: 4.57 X10E6/UL (ref 3.77–5.28)
SODIUM SERPL-SCNC: 141 MMOL/L (ref 134–144)
TSH SERPL DL<=0.005 MIU/L-ACNC: 1.78 UIU/ML (ref 0.45–4.5)
WBC # BLD AUTO: 10.2 X10E3/UL (ref 3.4–10.8)

## 2025-03-07 ENCOUNTER — TELEMEDICINE (OUTPATIENT)
Dept: PSYCHIATRY | Facility: CLINIC | Age: 45
End: 2025-03-07

## 2025-03-07 DIAGNOSIS — F33.2 SEVERE EPISODE OF RECURRENT MAJOR DEPRESSIVE DISORDER, WITHOUT PSYCHOTIC FEATURES: ICD-10-CM

## 2025-03-07 DIAGNOSIS — F41.1 GENERALIZED ANXIETY DISORDER: Primary | ICD-10-CM

## 2025-03-07 LAB
BACTERIA UR CULT: ABNORMAL
BACTERIA UR CULT: ABNORMAL
OTHER ANTIBIOTIC SUSC ISLT: ABNORMAL
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
## 2025-03-10 ENCOUNTER — RESULTS FOLLOW-UP (OUTPATIENT)
Dept: FAMILY MEDICINE CLINIC | Facility: CLINIC | Age: 45
End: 2025-03-10
Payer: COMMERCIAL

## 2025-03-11 ENCOUNTER — TELEPHONE (OUTPATIENT)
Dept: FAMILY MEDICINE CLINIC | Facility: CLINIC | Age: 45
End: 2025-03-11
Payer: COMMERCIAL

## 2025-03-11 NOTE — TELEPHONE ENCOUNTER
LM on  to call back.    HUB to relay message from Sudhir;     Please let patient know that insurance is denying CT scan of her abdomen and pelvis. If she still having the left-sided pain? If so, she can go to the emergency department and they can get a CT scan without needing approval from insurance. If the pain continues would recommend she follow-up. Thanks

## 2025-03-11 NOTE — TELEPHONE ENCOUNTER
Name: Samina Maya    Relationship: Self    Best Callback Number: [phone]    HUB PROVIDED THE RELAY MESSAGE FROM THE OFFICE   PATIENT VOICED UNDERSTANDING AND HAS NO FURTHER QUESTIONS AT THIS TIME    ADDITIONAL INFORMATION:

## 2025-03-11 NOTE — TELEPHONE ENCOUNTER
Please let patient know that insurance is denying CT scan of her abdomen and pelvis.  If she still having the left-sided pain?  If so, she can go to the emergency department and they can get a CT scan without needing approval from insurance.  If the pain continues would recommend she follow-up.  Thanks

## 2025-03-19 DIAGNOSIS — F41.9 ANXIETY: ICD-10-CM

## 2025-03-20 ENCOUNTER — TELEMEDICINE (OUTPATIENT)
Dept: PSYCHIATRY | Facility: CLINIC | Age: 45
End: 2025-03-20
Payer: COMMERCIAL

## 2025-03-20 DIAGNOSIS — F41.1 GENERALIZED ANXIETY DISORDER: Primary | ICD-10-CM

## 2025-03-20 DIAGNOSIS — F33.1 MAJOR DEPRESSIVE DISORDER, RECURRENT EPISODE, MODERATE: ICD-10-CM

## 2025-03-20 RX ORDER — BUSPIRONE HYDROCHLORIDE 30 MG/1
30 TABLET ORAL 2 TIMES DAILY
Qty: 60 TABLET | OUTPATIENT
Start: 2025-03-20
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
## 2025-04-14 ENCOUNTER — TELEMEDICINE (OUTPATIENT)
Dept: PSYCHIATRY | Facility: CLINIC | Age: 45
End: 2025-04-14
Payer: COMMERCIAL

## 2025-04-14 DIAGNOSIS — F99 INSOMNIA DUE TO OTHER MENTAL DISORDER: Chronic | ICD-10-CM

## 2025-04-14 DIAGNOSIS — F41.1 GENERALIZED ANXIETY DISORDER: Chronic | ICD-10-CM

## 2025-04-14 DIAGNOSIS — F51.05 INSOMNIA DUE TO OTHER MENTAL DISORDER: Chronic | ICD-10-CM

## 2025-04-14 DIAGNOSIS — F33.2 SEVERE EPISODE OF RECURRENT MAJOR DEPRESSIVE DISORDER, WITHOUT PSYCHOTIC FEATURES: Primary | Chronic | ICD-10-CM

## 2025-04-14 PROCEDURE — 90792 PSYCH DIAG EVAL W/MED SRVCS: CPT | Performed by: NURSE PRACTITIONER

## 2025-04-14 PROCEDURE — 1160F RVW MEDS BY RX/DR IN RCRD: CPT | Performed by: NURSE PRACTITIONER

## 2025-04-14 PROCEDURE — 1159F MED LIST DOCD IN RCRD: CPT | Performed by: NURSE PRACTITIONER

## 2025-04-14 RX ORDER — FLUOXETINE HYDROCHLORIDE 40 MG/1
40 CAPSULE ORAL DAILY
Qty: 60 CAPSULE | Refills: 0 | Status: SHIPPED | OUTPATIENT
Start: 2025-04-14

## 2025-04-14 RX ORDER — MIRTAZAPINE 15 MG/1
15 TABLET, FILM COATED ORAL NIGHTLY
Qty: 30 TABLET | Refills: 0 | Status: SHIPPED | OUTPATIENT
Start: 2025-04-14

## 2025-04-14 NOTE — PROGRESS NOTES
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 - 3.1 x10E3/uL Final    Monocytes Absolute 03/04/2025 1.2 (H)  0.1 - 0.9 x10E3/uL Final    Eosinophils Absolute 03/04/2025 0.2  0.0 - 0.4 x10E3/uL Final    Basophils Absolute 03/04/2025 0.0  0.0 - 0.2 x10E3/uL Final    Immature Granulocyte Rel % 03/04/2025 0  Not Estab. % Final    Immature Grans Absolute 03/04/2025 0.0  0.0 - 0.1 x10E3/uL Final    Glucose 03/04/2025 74  70 - 99 mg/dL Final    BUN 03/04/2025 11  6 - 24 mg/dL Final    Creatinine 03/04/2025 0.83  0.57 - 1.00 mg/dL Final    EGFR Result 03/04/2025 89  >59 mL/min/1.73 Final    BUN/Creatinine Ratio 03/04/2025 13  9 - 23 Final    Sodium 03/04/2025 141  134 - 144 mmol/L Final    Potassium 03/04/2025 4.5  3.5 - 5.2 mmol/L Final    Chloride 03/04/2025 103  96 - 106 mmol/L Final    Total CO2 03/04/2025 23  20 - 29 mmol/L Final    Calcium 03/04/2025 9.0  8.7 - 10.2 mg/dL Final    Total Protein 03/04/2025 6.8  6.0 - 8.5 g/dL Final    Albumin 03/04/2025 4.1  3.9 - 4.9 g/dL Final    Globulin 03/04/2025 2.7  1.5 - 4.5 g/dL Final    Total Bilirubin 03/04/2025 0.4  0.0 - 1.2 mg/dL Final    Alkaline Phosphatase 03/04/2025 90  44 - 121 IU/L Final    AST (SGOT) 03/04/2025 91 (H)  0 - 40 IU/L Final    ALT (SGPT) 03/04/2025 61 (H)  0 - 32 IU/L Final    TSH 03/04/2025 1.780  0.450 - 4.500 uIU/mL Final    Hemoglobin A1C  03/04/2025 6.0 (H)  4.8 - 5.6 % Final    Comment:          Prediabetes: 5.7 - 6.4           Diabetes: >6.4           Glycemic control for adults with diabetes: <7.0      25 Hydroxy, Vitamin D 03/04/2025 27.4 (L)  30.0 - 100.0 ng/mL Final    Comment: Vitamin D deficiency has been defined by the [place] of  [employer] and an Endocrine Society practice guideline as a  level of serum 25-OH vitamin D less than 20 ng/mL (1,2).  The Endocrine Society went on to further define vitamin D  insufficiency as a level between 21 and 29 ng/mL (2).  1. IOM ([place] of [employer]). 2010. Dietary reference     intakes for calcium and D. [place]: The     National Academies Press.  2. Liv MF, Yoni COURTNEY, Amparo DILLON, et al.     Evaluation, treatment, and prevention of vitamin D     deficiency: an Endocrine Society clinical practice     guideline. JCEM. 2011 Jul; 96(7):1911-30.      Magnesium 03/04/2025 2.1  1.6 - 2.3 mg/dL Final    Glucose 03/04/2025 118  70 - 130 mg/dL Final    HCG, Urine, QL 03/04/2025 Negative  Negative Final    Lot Number 03/04/2025 #[id number]   Final    Internal Positive Control 03/04/2025 Passed  Positive, Passed Final    Internal Negative Control 03/04/2025 Passed  Negative, Passed Final    Expiration Date 03/04/2025 12/16/2025   Final    Urine Culture 03/04/2025 Final report (A)   Final    Result 1 03/04/2025 Comment (A)   Final    Comment: Escherichia coli, identified by an automated biochemical system.  Susceptibility profile is consistent with a probable ESBL.  Multi-Drug Resistant Organism  Greater than 100,000 colony forming units per mL      Susceptibility Testing 03/04/2025 Comment   Final    Comment:       ** S = Susceptible; I = Intermediate; R = Resistant **                     P = Positive; N = Negative              MICS are expressed in micrograms per mL     Antibiotic                 RSLT#1    RSLT#2    RSLT#3    RSLT#4  Amoxicillin/Clavulanic Acid    S  Ampicillin                      R  Cefazolin                      R  Cefepime                       R  Cefoxitin                      S  Cefpodoxime                    R  Ceftriaxone                    R  Ciprofloxacin                  I  Ertapenem                      S  Gentamicin                     R  Levofloxacin                   I  Meropenem                      S  Nitrofurantoin                 S  Piperacillin/Tazobactam        S  Tetracycline                   R  Tobramycin                     I  Trimethoprim/Sulfa             R      Color 03/04/2025 Yellow  Yellow, Straw, Dark Yellow, Samina Final    Clarity, UA 03/04/2025 Clear  Clear Final    Specific Gravity  03/04/2025 1.025  1.005 - 1.030 Final    pH, Urine 03/04/2025 6.0  5.0 - 8.0 Final    Leukocytes 03/04/2025 Small (1+) (A)  Negative Final    Nitrite, UA 03/04/2025 Positive (A)  Negative Final    Protein, POC 03/04/2025 2+ (A)  Negative mg/dL Final    Glucose, UA 03/04/2025 Negative  Negative mg/dL Final    Ketones, UA 03/04/2025 Negative  Negative Final    Urobilinogen, UA 03/04/2025 Normal  Normal, 0.2 E.U./dL Final    Bilirubin 03/04/2025 Small (1+) (A)  Negative Final    Blood, UA 03/04/2025 Trace (A)  Negative Final    Lot Number 03/04/2025 [id number]   Final    Expiration Date 03/04/2025 09/30/25   Final         Assessment & Plan   Diagnoses and all orders for this visit:    1. Severe episode of recurrent major depressive disorder, without psychotic features (Primary)  -     FLUoxetine (PROzac) 40 MG capsule; Take 1 capsule by mouth Daily.  Dispense: 60 capsule; Refill: 0  -     mirtazapine (REMERON) 15 MG tablet; Take 1 tablet by mouth Every Night.  Dispense: 30 tablet; Refill: 0    2. Generalized anxiety disorder  -     FLUoxetine (PROzac) 40 MG capsule; Take 1 capsule by mouth Daily.  Dispense: 60 capsule; Refill: 0  -     mirtazapine (REMERON) 15 MG tablet; Take 1 tablet by mouth Every Night.  Dispense: 30 tablet; Refill: 0    3. Insomnia due to other mental  disorder  -     mirtazapine (REMERON) 15 MG tablet; Take 1 tablet by mouth Every Night.  Dispense: 30 tablet; Refill: 0        Visit Diagnoses:    ICD-10-CM ICD-9-CM   1. Severe episode of recurrent major depressive disorder, without psychotic features  F33.2 296.33   2. Generalized anxiety disorder  F41.1 300.02   3. Insomnia due to other mental disorder  F51.05 300.9    F99 327.02            GOALS:  Short Term Goals: Patient will be compliant with medication, and patient will have no significant medication related side effects.  Patient will be engaged in psychotherapy as indicated.  Patient will report subjective improvement of symptoms.  Long term goals: To stabilize mood and treat/improve subjective symptoms, the patient will stay out of the hospital, the patient will be at an optimal level of functioning, and the patient will take all medications as prescribed.  The patient verbalized understanding and agreement with goals that were mutually set.      SUICIDE RISK ASSESSMENT: Unalterable demographics and a history of mental health intervention indicate this patient is in a high risk category compared to the general population. At present, the patient denies active SI/HI, intentions, or plans at this time and agrees to seek immediate care should such thoughts develop. The patient verbalizes understanding of how to access emergency care if needed and agrees to do so. Consideration of suicide risk and protective factors such as history, current presentation, individual strengths and weaknesses, psychosocial and environmental stressors and variables, psychiatric illness and symptoms, medical conditions and pain, took place in this interview. Based on those considerations, the patient is determined: within individual baseline and presenting no imminent risk for suicide or homicide. Other recommendations: The patient does not meet the criteria for inpatient admission and is not a safety risk to self or others at  today's visit. Inpatient treatment offers no significant advantages over outpatient treatment for this patient at today's visit.      SAFETY PLAN:  Patient was given ample time for questions and fully participated in treatment planning.  Patient was encouraged to call the clinic with any questions or concerns.  Patient was informed of access to emergency care. If patient were to develop any significant symptomatology, suicidal ideation, homicidal ideation, any concerns, or feel unsafe at any time they are to call the clinic and if unable to get immediate assistance should immediately call 911 or go to the nearest emergency room.  The patient is advised to remove or secure (lock away) all lethal weapons (including guns) and sharps (including razors, scissors, knives, etc.).  All medications (including any prescribed and any over the counter medications) should be stored in a safe and secured location that is not obtainable by children/adolescents.  Patient was given an opportunity and encouraged to ask questions about their medication, illness, and treatment. Patient contracted verbally for the following: If you are experiencing an emotional crisis or have thoughts of harming yourself or others, please go to your nearest local emergency room or call 911. Will continue to re-assess medication response and side effects frequently to establish efficacy and ensure safety. Risks, any black box warnings, side effects, off label usage, and benefits of medication and treatment discussed with patient, along with potential adverse side effects of current and/or newly prescribed medication, alternative treatment options, and OTC medications.  Patient verbalized understanding of potential risks, any off label use of medication, any black box warnings, and any side effects in their own words. The patient verbalized understanding and agreed to comply with the safety plan discussed in their own words.  Patient given the number to  the office. Number also available to the 24- hour suicide hotline.       TREATMENT PLAN: Continue supportive psychotherapy efforts and medications as indicated.  Medication and treatment options, both pharmacological and non-pharmacological treatment options, discussed during today's visit, including any off label use of medication. Patient acknowledged and verbally consented with current treatment plan and was educated on the importance of compliance with treatment and follow-up appointments.          -Decrease BuSpar to 15 mg twice daily x2 weeks.  The patient was instructed to take 1/2 tablet of her current prescription of 30 mg tablets to achieve decreased dose of 15 mg.  The patient verbalizes understanding in her own words and endorses that she is agreeable to this.  Beginning tapering off of this medication at this time per patient request due to concern for loss of efficacy.  Discussed with the patient that if she tolerates decreased dose well and continues to be concerned regarding lack of efficacy with this medication we will plan to continue tapering at next visit by decreasing to 7.5 mg twice daily x2 weeks and then discontinuing.  The patient verbalizes understanding in her own words and endorses that she is agreeable to this.  -Increase Prozac to 80 mg daily for depression/anxiety.  Discussed with the patient that we will trial maximizing the dose of Prozac in effort to obtain better management of symptoms at this time given that she feels this medication has previously been effective for her.  Discussed with the patient that current dose just may not be adequate given recent life circumstances and/or the amount of time that she has been taking previous dose may have created some degree of tolerance, so discussed with the patient that an increased dose may be more beneficial for her at this time.  Discussed with the patient that if she does not notice any further improvements or changes with the  increased dose of Prozac we will likely discuss tapering off of this medication and trialing an alternative medication option, such as Zoloft.  The patient verbalizes understanding in her own words and endorses that she is agreeable to this.  -Begin Remeron 15 mg nightly for insomnia and as adjunct for depression/anxiety.  Discussed with the patient that Remeron dosing is inversely proportional to sedative properties of this medication, and explained that lower doses are theoretically more sedating than higher doses.  Discussed with the patient that if the starting dose of this medication seems to be too sedating we will likely trial increasing the dose in effort to improve this.  Discussed with the patient that not everyone responds to Remeron in the same way and that if they experience more sedation with increasing the dose to notify this APRN and we will plan to trial decreasing the dose in effort to improve this.  The patient verbalizes understanding in their own words and endorses that they are agreeable to this.     -The patient brought up potentially being prescribed Klonopin as she reports she was prescribed this medication previously and had good symptom control.  Discussed with the patient that Klonopin is not recommended for long-term continuation due to potential safety risks/adverse effects associated with benzodiazepines including cognitive concerns/memory loss, early onset dementia, and addiction with continued use.  Discussed with the patient this is even more concerning for her given her significant family history of dementia, so his APRN does not recommend initiating long-term treatment with Klonopin at this time.  Discussed with the patient that ideally symptoms will be controlled with alternative treatment/medications that are regarded as safe for long-term continuation, such as maintenance treatment with SSRIs such as Prozac or Zoloft.  The patient verbalizes understanding in her own words and  endorses that she is agreeable to this.  -Rule out PTSD          MEDICATION ISSUES: Discussed medication options and treatment plan of prescribed medication, any off label use of medication, as well as the risks, benefits, any black box warnings including increased suicidality, and side effects including but not limited to potential falls, dizziness, possible impaired driving, GI side effects (change in appetite, abdominal discomfort, nausea, vomiting, diarrhea, and/or constipation), dry mouth, somnolence, sedation, insomnia, activation, agitation, irritation, tremors, abnormal muscle movements or disorders, headache, sweating, possible bruising or rare bleeding, electrolyte and/or fluid abnormalities, change in blood pressure/heart rate/and or heart rhythm, sexual dysfunction, and metabolic adversities among others. Patient and/or guardian agreeable to call the office with any worsening of symptoms or onset of side effects, or if any concerns or questions arise.  The contact information for the office is made available to the patient and/or guardian.  Patient and/or guardian agreeable to call 911 or go to the nearest ER should they begin having any SI/HI, or if any urgent concerns arise. No medication side effects or related complaints today.    This APRN has discussed with the patient/guardian about the possibility of serotonin syndrome when certain medications are taken together, as is the case with this patient.  This APRN has provided the patient/guardian with a list of symptoms of serotonin syndrome including symptoms of autonomic instability, altered sensorium, confusion, restlessness, agitation, myoclonus, hyperreflexia, hyperthermia, diaphoresis, tremor, chills, diarrhea and cramps, ataxia, headache, migraines, seizures, and insomnia; which could lead to permanent hyperthermic brain damage, cardiovascular collapse, coma, or even death.  The patient/guardian are instructed to stop medications immediately and  either contact this APRN/this office during regular office hours, or go to the emergency department/call 911, if they begin to experience any of the symptoms discussed.  The benefits and risks of the current medication regimen are discussed with the patient/guardian, and they feel that the benefits out weigh the risks.  The patient/guardian verbalized understanding and agreement in their own words.                  VERBAL INFORMED CONSENT FOR MEDICATION:  The patient was educated that their proposed/prescribed psychotropic medication(s) has potential risks, side effects, adverse effects, and black box warnings; and these have been discussed with the patient.  The patient has been informed that their treatment and medication dosage is to be individualized, and may even be above or below the recommended range/dosage due to patient individualization and response, but medication is prescribed using a shared decision making approach, and no medication or dosage will be prescribed without the patient's verbal consent.  The reason for the use of the medication including any off label use and alternative modes of treatment other than or in addition to medication has been considered and discussed, the probable consequences of not receiving the proposed treatment have been discussed, and any treatment side effects, black box warnings, and cautions associated with treatment have been discussed with the patient.  The patient is allowed ample time to openly discuss and ask questions regarding the proposed medication(s) and treatment plan and the patient verbalizes understanding the reasons for the use of the medication, its potential risks and benefits, other alternative treatment(s), and the probable consequences that may occur if the proposed medication is not given.  The patient has been given ample time to ask questions and study the information and find the information to be specific, accurate, and complete.  The patient  gives verbal consent for the medication(s) proposed/prescribed, they verbalized understanding that they can refuse and withdraw consent at any time with the assistance of this APRN, and the patient has verbally confirmed that they are aware, and are willing, to take the prescribed medication and follow the treatment plan with the known possible risks, side effect, black box warnings, and any potential medication interactions, and the patient reports they will be worse off without this medication and treatment plan.  The patient is advised to contact this APRN/this office if any questions or concerns arise at any time (at [phone]), or call 911/go to the closest emergency department if needed or outside of office hours.         [facility] No Show Policy:  We understand unexpected circumstances arise; however, anytime you miss your appointment we are unable to provide you appropriate care.  In addition, each appointment missed could have been used to provide care for others.  We ask that you call at least 24 hours in advance to cancel or reschedule an appointment.  We would like to take this opportunity to remind you of our policy stating patients who miss THREE or more appointments without cancelling or rescheduling 24 hours in advance of the appointment may be subject to cancellation of any further visits with our clinic and recommendation to seek in-person services/visits.    Please call [phone] to reschedule your appointment. If there are reasons that make it difficult for you to keep the appointments, please call and let us know how we can help.  Please understand that medication prescribing will not continue without seeing your provider.      [facility]'s No Show Policy reviewed with patient at today's visit. Patient verbalized understanding of this policy. Discussed with patient that in the event that there are three or more no show visits, it will be  recommended that they pursue in-person services/visits as noncompliance with telehealth visits indicates that patient is not an appropriate candidate for telemedicine and would likely be more appropriate for in-person services/visits. Patient verbalizes understanding and is agreeable to this.             MEDS ORDERED DURING VISIT:  New Medications Ordered This Visit   Medications    FLUoxetine (PROzac) 40 MG capsule     Sig: Take 1 capsule by mouth Daily.     Dispense:  60 capsule     Refill:  0    mirtazapine (REMERON) 15 MG tablet     Sig: Take 1 tablet by mouth Every Night.     Dispense:  30 tablet     Refill:  0       Return in about 2 weeks (around 4/28/2025), or if symptoms worsen or fail to improve, for Recheck.           The patient will follow-up in approximately 2 weeks, and follow-up appointment has been scheduled with the patient confirming agreeability/availability for date/time during today's encounter.  Instructed the patient to contact the [facility] if they have any questions or concerns, and that a sooner appointment will be provided if needed.  The patient verbalized understanding in their own words and endorsed that they are agreeable to this.      Functional Status: Moderate impairment     Prognosis: Guarded with Ongoing Treatment             This document has been electronically signed by MERYL Sims  April 14, 2025 14:51 EDT    Part of this note may be an electronic transcription/translation of spoken language to printed text using the Dragon Dictation System.

## 2025-04-18 ENCOUNTER — TELEMEDICINE (OUTPATIENT)
Dept: PSYCHIATRY | Facility: CLINIC | Age: 45
End: 2025-04-18
Payer: COMMERCIAL

## 2025-04-18 DIAGNOSIS — F41.1 GENERALIZED ANXIETY DISORDER: Primary | ICD-10-CM

## 2025-04-18 DIAGNOSIS — F33.1 MAJOR DEPRESSIVE DISORDER, RECURRENT EPISODE, MODERATE: ICD-10-CM

## 2025-04-18 NOTE — PROGRESS NOTES
Mode of Visit: Video  Location of patient: -HOME-  Location of provider: +HOME+  You have chosen to receive care through a telehealth visit.  The patient has signed the video visit consent form.  The visit included audio and video interaction. No technical issues occurred during this visit.    Date: 2025  Time In: 11:00 AM  Time out: 11:39 AM    This provider is located at [facility], [address] [place] [address], [facility], using a secure [employer] Video Visit through [employer]. Patient is being seen remotely via telehealth at their home address is located in [place]. Patient stated they are in a secure environment for this session. The patient's condition being diagnosed and treated is appropriate for telemedicine. The provider identified themself as well as their credentials. The patient, or  patient's legal guardian consent to be seen remotely, and when consent is given they understand that the consent allows for patient identifiable information to be sent to a third party as needed. They may refuse to be seen remotely at any time. The electronic data is encrypted and password protected, and the patient's or  legal guardian has been advised of the potential risks to privacy not withstanding such measures.   PT Identifiers used: Name and .    You have chosen to receive care through a telehealth visit.  Do you consent to use a video/audio connection for your medical care today? Yes    Subjective   Samina Maya is a 44 y.o. female who presents today for follow up    Chief Complaint:   Chief Complaint   Patient presents with    Anxiety    Depression        History of Present Illness:   Depression  Presents for follow-up visit. Symptoms include decreased concentration, depressed mood, excessive worry, insomnia, irritability, muscle tension, nervousness/anxiety, restlessness and malaise/fatigue. Symptoms occur most days.  The severity of symptoms is interfering with daily  activities.  The quality of sleep is fair. Awakens seldom during the night. Her past medical history is significant for depression. Past treatments include SSRI, non-benzodiazephine anxiolytics, medications and lifestyle changes. The treatment provides moderate relief. Compliance with medications is %.   Anxiety  Presents for follow-up visit.  Symptoms include decreased concentration, depressed mood, excessive worry, insomnia, irritability, muscle tension, nervous/anxious behavior, restlessness and malaise/fatigue. Symptoms occur most days. The severity of symptoms is interfering with daily activities. The quality of sleep is fair. Awakens seldom during the night. Her past medical history is significant for depression. Past treatments include SSRIs, non-benzodiazephine anxiolytics and lifestyle changes. The treatment provided moderate relief. Compliance with medications is %.          Clinical Maneuvering/Intervention:      Assisted patient in processing above session content; acknowledged and normalized patient’s thoughts, feelings, and concerns.  Rationalized patient thought process regarding concerns presented at session.  Discussed triggers associated with patient's  anxiety  and depression  Also discussed coping skills for patient to implement such as increasing activity , self care , and positive self talk     Allowed patient to freely discuss issues without interruption or judgment. Provided safe, confidential environment to facilitate the development of positive therapeutic relationship and encourage open, honest communication. Assisted patient in identifying risk factors which would indicate the need for higher level of care including thoughts to harm self or others and/or self-harming behavior and encouraged patient to contact this office, call 911, or present to the nearest emergency room should any of these events occur. Discussed crisis intervention services and means to access. Patient  adamantly and convincingly denies current suicidal or homicidal ideation or perceptual disturbance.    Assessment: Patient reported no active suicidal ideations, self-injurious behaviors, or homicidal ideations. Patient reported improvement in sleeping patterns. Patient reported improvement in appetite, as well. Therapist continued to use Rogerian-focused interventions with patient to build rapport and to provide unconditional positive regard. Patient and therapist collaborated to update clinical treatment plan in session today. Patient and therapist continued to explore triggers for anxiety and depression with patient identifying recently breaking her arm, as well as receiving news that mother's cancer is now terminal as primary triggers. Patient and therapist processed these triggers and discussed appropriate coping strategies including the use of daily self-care practices, participating in enjoyed activities, and the use of cognitive behavioral strategies to assist with reframing negative thought processes to create realistic and empowering thought processes.    Patient appears to maintain relative stability as compared to their baseline.  However, patient continues to struggle with   Chief Complaint   Patient presents with    Anxiety    Depression    which continues to cause impairment in important areas of functioning.  A result, they can be reasonably expected to continue to benefit from treatment and would likely be at increased risk for decompensation otherwise.      PHQ-Score Total:  PHQ-9 Total Score: 10    CONSTANZA-7 Score Total:  Over the last two weeks, how often have you been bothered by the following problems?  Feeling nervous, anxious or on edge: Nearly every day  Not being able to stop or control worrying: Nearly every day  Worrying too much about different things: Nearly every day  Trouble Relaxing: Several days  Being so restless that it is hard to sit still: Nearly every day  Becoming easily annoyed  or irritable: More than half the days  Feeling afraid as if something awful might happen: Several days  CONSTANZA 7 Total Score: 16  If you checked any problems, how difficult have these problems made it for you to do your work, take care of things at home, or get along with other people: Very difficult      MENTAL STATUS EXAM   General Appearance:  Cleanly groomed and dressed and well developed  Eye Contact:  Good eye contact  Attitude:  Cooperative and polite  Motor Activity:  Normal gait, posture  Muscle Strength:  Normal  Speech:  Normal rate, tone, volume  Language:  Stereotypical  Mood and affect:  Normal, pleasant, appropriate, mood congruent and euthymic  Hopelessness:  Denies  Loneliness: 5  Thought Process:  Logical and goal-directed  Associations/ Thought Content:  No delusions  Hallucinations:  None  Suicidal Ideations:  Not present  Homicidal Ideation:  Not present  Sensorium:  Alert and clear  Orientation:  Person, place, time and situation  Immediate Recall, Recent, and Remote Memory:  Intact  Attention Span/ Concentration:  Good  Fund of Knowledge:  Appropriate for age and educational level  Intellectual Functioning:  Average range  Insight:  Good  Judgement:  Good  Reliability:  Good  Impulse Control:  Good         Patient's Support Network Includes:  extended family    Progress toward goal: Not at goal    Prognosis: Good with Ongoing Treatment     Plan: Patient will continue ongoing therapeutic mental health services to assist with stabilization and overall improved quality of life through the use of coping skills and psychoeducation.     Patient will continue in individual outpatient therapy with focus on improved functioning and coping skills, maintaining stability, and avoiding decompensation and the need for higher level of care.    Patient will adhere to medication regimen as prescribed and report any side effects. Patient will contact this office, call 911 or present to the nearest emergency room  should suicidal or homicidal ideations occur. Provide Cognitive Behavioral Therapy and Solution Focused Therapy to improve functioning, maintain stability, and avoid decompensation and the need for higher level of care.     Return in about 2 weeks (around 5/2/2025) for Video visit, Next scheduled follow up.      VISIT DIAGNOSIS:    Diagnosis Plan   1. Generalized anxiety disorder        2. Major depressive disorder, recurrent episode, moderate                      This document has been electronically signed by SHIVAM Meneses  April 18, 2025      Part of this note may be an electronic transcription/translation of spoken language to printed text using the Dragon Dictation System.

## 2025-04-18 NOTE — TREATMENT PLAN
"Multi-Disciplinary Problems (from Behavioral Health Treatment Plan)      Active Problems       Problem: Depression  Start Date: 04/18/25      Problem Details: The patient self-scales this problem as a 7 with 10 being the worst.    Patient reported low mood and low motivation more often than not for at least the last two weeks. Patient reported low appetite, fatigue, poor concentration, and sporadic sleep as symptoms.           Goal Priority Start Date Expected End Date End Date    Patient will demonstrate the ability to initiate new constructive life skills outside of sessions on a consistent basis. High 04/18/25 10/17/25 --    Goal Details: Progress toward goal:  Not appropriate to rate progress toward goal since this is the initial treatment plan.    \"I want to keep working on my depression since I've had these changes.\"    Patient will self-report overall decrease in average daily impact of depression from 7/10 to at least 5/10 within the next three months.         Goal Intervention Frequency Start Date End Date    Assist patient in setting attainable activities of daily living goals. Q2 Weeks 04/18/25 --    Intervention Details: Patient will set at least two achievable daily living goals and work towards completion of these goals at least twice per month for the next three months.         Goal Intervention Frequency Start Date End Date    Provide education about depression Q Month 04/18/25 --    Intervention Details: Duration of treatment until remission of symptoms.    Patient will receive psychoeducation about depression at least once per month for the next three months.         Goal Intervention Frequency Start Date End Date    Assist patient in developing healthy coping strategies. Q Month 04/18/25 --    Intervention Details: Duration of treatment until remission of symptoms.    Patient will learn and implement at least two new coping skills per month for the next three months.                         " Reviewed By       Elisabeth Nava, [facility] 04/18/25 1121

## 2025-05-06 ENCOUNTER — TELEMEDICINE (OUTPATIENT)
Dept: PSYCHIATRY | Facility: CLINIC | Age: 45
End: 2025-05-06
Payer: COMMERCIAL

## 2025-05-06 DIAGNOSIS — F51.05 INSOMNIA DUE TO OTHER MENTAL DISORDER: Chronic | ICD-10-CM

## 2025-05-06 DIAGNOSIS — F99 INSOMNIA DUE TO OTHER MENTAL DISORDER: Chronic | ICD-10-CM

## 2025-05-06 DIAGNOSIS — F41.1 GENERALIZED ANXIETY DISORDER: Chronic | ICD-10-CM

## 2025-05-06 DIAGNOSIS — F33.2 SEVERE EPISODE OF RECURRENT MAJOR DEPRESSIVE DISORDER, WITHOUT PSYCHOTIC FEATURES: Primary | Chronic | ICD-10-CM

## 2025-05-06 RX ORDER — HYDROXYZINE HYDROCHLORIDE 10 MG/1
10-30 TABLET, FILM COATED ORAL 2 TIMES DAILY PRN
Qty: 90 TABLET | Refills: 0 | Status: SHIPPED | OUTPATIENT
Start: 2025-05-06

## 2025-05-06 RX ORDER — METOPROLOL TARTRATE 25 MG/1
25 TABLET, FILM COATED ORAL 2 TIMES DAILY
Qty: 180 TABLET | Refills: 2 | Status: SHIPPED | OUTPATIENT
Start: 2025-05-06

## 2025-05-06 RX ORDER — FLUOXETINE HYDROCHLORIDE 40 MG/1
80 CAPSULE ORAL DAILY
Qty: 60 CAPSULE | Refills: 0 | Status: SHIPPED | OUTPATIENT
Start: 2025-05-06

## 2025-05-06 RX ORDER — MIRTAZAPINE 15 MG/1
15 TABLET, FILM COATED ORAL NIGHTLY
Qty: 30 TABLET | Refills: 0 | Status: SHIPPED | OUTPATIENT
Start: 2025-05-06

## 2025-05-06 NOTE — PROGRESS NOTES
This provider is located at home office working remotely through the [facility] ([facility] [address], [facility], [id number] using a secure Venue[employer] Video Visit through [employer]. Patient is being seen remotely via telehealth at their home address in [place], and stated they are in a secure environment for this session. The patient's condition being diagnosed/treated is appropriate for telemedicine. The provider identified herself as well as her credentials. The patient, and/or patients guardian, consent to be seen remotely, and when consent is given they understand that the consent allows for patient identifiable information to be sent to a third party as needed. They may refuse to be seen remotely at any time. The electronic data is encrypted and password protected, and the patient and/or guardian has been advised of the potential risks to privacy not withstanding such measures.    You have chosen to receive care through a telehealth visit.  Do you consent to use a video/audio connection for your medical care today? Yes     Patient identifiers utilized: Name and date of birth.    Patient verbally confirmed consent for today's encounter  05/06/2025 .    The patient does verbally confirm they are being seen today while physically located in the [facility].  This provider/this APRN is licensed in the [facility] where the patient is located/being seen.         Subjective   Samina Maya is a 45 y.o. female who is here today for medication management follow up.    Chief Complaint: Depression, anxiety, insomnia     Accompanied by: Self - no one else besides the patient present at today's encounter      History of Present Illness:  -Last visit with this APRN: 04/14/2025  -Since last encounter with this APRN/Office: The patient states that she has not been doing good recently.  She states that her mother is in the active stages of dying and endorses  that this has been really difficult for her.  She states that her mom came home from the hospital on Friday and endorses that she has been rapidly progressing since then.  She states for example that when her mother first got home she was walking and talking and still able to do a lot of things for herself, but states that now she is bedbound and unresponsive.  She states that hospice is involved and they are working to try to keep her mother comfortable, which she endorses she is thankful for, but states that overall it has been really difficult for her to deal with.  She states that losing her son and now losing her mother is just more than she thought she would ever have to deal with at one time.  She endorses that prior to emergence of recent situational stressors she felt that things have been going better for her.  She states that she has not been able to take the Remeron for the past week since she is having to provide around-the-clock care for her mother, but states that when she was taking the medication it was really helpful for improving her sleep.  She also states that her appetite was quite significantly improved with taking this medication.  Reports that appetite currently has been rather poor and that she is typically just snacking throughout the day to keep something on her stomach, but endorses that when taking the Remeron she felt that appetite was much better.  She also states that she has noticed some improvements with the increased dose of Prozac.  She states for example that she feels she is better able to manage her emotions in front of her mother and her family members while dealing with current situational stressors, which she endorses she thinks is in part due to the increased dose of Prozac and helping to better manage her depression and anxiety symptoms.  She states that depression and anxiety are currently exacerbated due to situational stressors, but endorses that overall she thinks the  "higher dose of medication has helped to keep her symptoms more manageable given the circumstances.  -Patient rates symptoms of depression at a 9/10 on a 0-10 scale, with 10 being the worst.   -Patient endorses she feels unable to rate anxiety on a 0-10 scale with 10 being the worst at this time given situational stressors and life circumstances that are contributing to anxiety currently, but states that her anxiety \"is up there\" when speaking in regard to the severity of her symptoms currently.    -Changes in medications or new medical problems/concerns since last visit: Denies  -Reported medication compliance: The patient reports that she went had tapered herself off of the BuSpar completely due to concern for inefficacy, rather than following tapering schedule laid out by this APRN.  Reports that she has been off of the BuSpar for approximately 4 days now.  Reports that she tolerated well and denies any exacerbation of symptoms with tapering off.  The patient otherwise reports compliance with their current psychotropic medication regimen.    -Reported medication side effects or concerns: Denies    -Auditory or visual hallucinations: Denies  -Behaviors different from patient baseline, or any reckless, impulsive, or risky behaviors: Denies  -Symptoms of sherron or psychosis: Denies  -Self-injurious behavior: Denies  -SI/HI: The patient adamantly denies any suicidal or homicidal ideations, plans, or intent at the time of this encounter and is convincing.  -No abnormal muscle movements or tics noted by this APRN during today's encounter, and the patient denies any of these symptoms.      -Using a shared decision-making approach the patient reports she would be interested in initiating treatment with a medication that can be used as needed for anxiety to help with \"her nerves\" given recent situational stressors and life circumstances.  The patient states that otherwise they would like to continue their current " "treatment/medication regimen without any adjustments/changes at this time.  When discussing medication efficacy with the patient, and reassessing the need and appropriateness of continued psychotropic medication treatment and doses, they report they are pleased with management of symptoms at this time, and that their current treatment/medication regimen has continued to be effective for them and they do not want to make any changes or adjustments at today's encounter.    -The patient does verbally contract for safety at today's encounter and is in verbal agreement with the safety/crisis plan. The patient reports in their own words that they will reach out to this APRN/office prior to next scheduled appointment if there is any worsening of mood, any new psychiatric symptoms, any medication side effects or concerns, any concern for safety to self or others, any suicidal or homicidal ideations plans or intent, or any concerns, or they will call 911, call or text the suicide and crisis lifeline at [phone], or go to the closest emergency department.              LMP:  Postmenopausal.  Denies any chance of pregnancy at this time.   The patient was educated that her prescribed medications can have potential risk to a developing fetus. The patient is advised to contact this APRN/this office if she becomes pregnant or plans to become pregnant.  Pt verbalizes understanding and acknowledged agreement with this plan in her own words.       Prior Psychiatric Medications:  Klonopin - reports very effective for her.  Reports that she felt this medication gave her motivation and \"a boost\" and helped her to want to get up and do things.  Reports that this medication was discontinued in 2016 after she had to transfer care due to previous psychiatrist moving out of state and PCP being unwilling to continue both her pain medication/opiates and Klonopin concurrently.    Valium - reports ineffective  Xanax - reports \"made her mean\"      The " following portions of the patient's history were reviewed and updated as appropriate: allergies, current medications, past family history, past medical history, past social history, past surgical history, and problem list.    Review of Systems   Constitutional:  Positive for appetite change and fatigue. Negative for activity change and unexpected weight change.   Psychiatric/Behavioral:  Positive for decreased concentration, dysphoric mood and sleep disturbance. Negative for agitation, behavioral problems, confusion, hallucinations, self-injury and suicidal ideas. The patient is nervous/anxious. The patient is not hyperactive.        Objective   Physical Exam  Constitutional:       Appearance: Normal appearance.   Neurological:      Mental Status: She is alert.   Psychiatric:         Attention and Perception: Attention and perception normal.         Mood and Affect: Affect normal. Mood is anxious and depressed.         Speech: Speech normal.         Behavior: Behavior normal. Behavior is cooperative.         Thought Content: Thought content normal. Thought content does not include homicidal or suicidal ideation. Thought content does not include homicidal or suicidal plan.         Cognition and Memory: Cognition and memory normal.         Judgment: Judgment normal.       There were no vitals taken for this visit.    The patient was seen remotely today via a MyChart Video Visit through [facility].  Unable to obtain vital signs due to nature of remote visit.  Height stated at 65 inches.  Weight stated at 129 pounds.     Due to the nature of virtual visits and inability to monitor vital signs and weight with virtual visits, the patient has been encouraged to monitor their vital signs and weight regularly either through self-monitoring via home device(s) or with their Primary Care Provider, and the patient has been instructed to notify this APRN of any abnormalities or significant changes from baseline.       Allergies    Allergen Reactions    Lisinopril Swelling    Morphine Nausea And Vomiting     Only pill form    Doxycycline Nausea And Vomiting    Aspirin Nausea And Vomiting    Duloxetine Unknown - Low Severity     Skin high sensitive; felt like skin was crawling    Hydrocodone Nausea And Vomiting     HA    Methadone Nausea And Vomiting    Pregabalin Swelling       Recent Results (from the past 12 weeks)   POC Rapid Strep A    Collection Time: 02/11/25  2:54 PM    Specimen: Swab   Result Value Ref Range    Rapid Strep A Screen Negative Negative, VALID, INVALID, Not Performed    Internal Control Passed Passed    Lot Number [id number]     Expiration Date 03/06/2027    Covid-19 + Flu A&B AG, Veritor    Collection Time: 02/11/25  2:55 PM    Specimen: Swab   Result Value Ref Range    SARS Antigen Not Detected Not Detected, Presumptive Negative    Influenza A Antigen DWAIN Not Detected Not Detected    Influenza B Antigen DWAIN Not Detected Not Detected    Internal Control Passed Passed    Lot Number [id number]     Expiration Date 11/27/2025    POC Glucose    Collection Time: 03/04/25  3:01 PM    Specimen: Blood   Result Value Ref Range    Glucose 118 70 - 130 mg/dL   POC Pregnancy, Urine    Collection Time: 03/04/25  3:03 PM    Specimen: Urine   Result Value Ref Range    HCG, Urine, QL Negative Negative    Lot Number #[id number]     Internal Positive Control Passed Positive, Passed    Internal Negative Control Passed Negative, Passed    Expiration Date 12/16/2025    POC Urinalysis Dipstick, Automated    Collection Time: 03/04/25  3:07 PM    Specimen: Urine   Result Value Ref Range    Color Yellow Yellow, Straw, Dark Yellow, Samina    Clarity, UA Clear Clear    Specific Gravity  1.025 1.005 - 1.030    pH, Urine 6.0 5.0 - 8.0    Leukocytes Small (1+) (A) Negative    Nitrite, UA Positive (A) Negative    Protein, POC 2+ (A) Negative mg/dL    Glucose, UA Negative Negative mg/dL    Ketones, UA Negative Negative    Urobilinogen, UA Normal Normal,  0.2 E.U./dL    Bilirubin Small (1+) (A) Negative    Blood, UA Trace (A) Negative    Lot Number [id number]     Expiration Date 09/30/25    Urine Culture - Urine, Urine, Clean Catch    Collection Time: 03/04/25  3:15 PM    Specimen: Urine, Clean Catch    UR   Result Value Ref Range    Urine Culture Final report (A)     Result 1 Comment (A)     Susceptibility Testing Comment    CBC & Differential    Collection Time: 03/04/25  3:27 PM    Specimen: Blood   Result Value Ref Range    WBC 10.2 3.4 - 10.8 x10E3/uL    RBC 4.57 3.77 - 5.28 x10E6/uL    Hemoglobin 13.0 11.1 - 15.9 g/dL    Hematocrit 40.6 34.0 - 46.6 %    MCV 89 79 - 97 fL    MCH 28.4 26.6 - 33.0 pg    MCHC 32.0 31.5 - 35.7 g/dL    RDW 15.1 11.7 - 15.4 %    Platelets 322 150 - 450 x10E3/uL    Neutrophil Rel % 62 Not Estab. %    Lymphocyte Rel % 24 Not Estab. %    Monocyte Rel % 12 Not Estab. %    Eosinophil Rel % 2 Not Estab. %    Basophil Rel % 0 Not Estab. %    Neutrophils Absolute 6.3 1.4 - 7.0 x10E3/uL    Lymphocytes Absolute 2.4 0.7 - 3.1 x10E3/uL    Monocytes Absolute 1.2 (H) 0.1 - 0.9 x10E3/uL    Eosinophils Absolute 0.2 0.0 - 0.4 x10E3/uL    Basophils Absolute 0.0 0.0 - 0.2 x10E3/uL    Immature Granulocyte Rel % 0 Not Estab. %    Immature Grans Absolute 0.0 0.0 - 0.1 x10E3/uL   Comprehensive Metabolic Panel    Collection Time: 03/04/25  3:27 PM    Specimen: Blood   Result Value Ref Range    Glucose 74 70 - 99 mg/dL    BUN 11 6 - 24 mg/dL    Creatinine 0.83 0.57 - 1.00 mg/dL    EGFR Result 89 >59 mL/min/1.73    BUN/Creatinine Ratio 13 9 - 23    Sodium 141 134 - 144 mmol/L    Potassium 4.5 3.5 - 5.2 mmol/L    Chloride 103 96 - 106 mmol/L    Total CO2 23 20 - 29 mmol/L    Calcium 9.0 8.7 - 10.2 mg/dL    Total Protein 6.8 6.0 - 8.5 g/dL    Albumin 4.1 3.9 - 4.9 g/dL    Globulin 2.7 1.5 - 4.5 g/dL    Total Bilirubin 0.4 0.0 - 1.2 mg/dL    Alkaline Phosphatase 90 44 - 121 IU/L    AST (SGOT) 91 (H) 0 - 40 IU/L    ALT (SGPT) 61 (H) 0 - 32 IU/L   TSH Rfx On Abnormal  To Free T4    Collection Time: 03/04/25  3:27 PM    Specimen: Blood   Result Value Ref Range    TSH 1.780 0.450 - 4.500 uIU/mL   Hemoglobin A1c    Collection Time: 03/04/25  3:27 PM    Specimen: Blood   Result Value Ref Range    Hemoglobin A1C 6.0 (H) 4.8 - 5.6 %   Vitamin D,25-Hydroxy    Collection Time: 03/04/25  3:27 PM    Specimen: Blood   Result Value Ref Range    25 Hydroxy, Vitamin D 27.4 (L) 30.0 - 100.0 ng/mL   Magnesium    Collection Time: 03/04/25  3:27 PM    Specimen: Blood   Result Value Ref Range    Magnesium 2.1 1.6 - 2.3 mg/dL       Current Medications:   Current Outpatient Medications   Medication Sig Dispense Refill    albuterol sulfate  (90 Base) MCG/ACT inhaler Inhale 2 puffs Every 4 (Four) Hours As Needed for Wheezing. 18 g 11    aspirin 81 MG EC tablet Take 1 tablet by mouth Daily. 90 tablet 3    atorvastatin (LIPITOR) 40 MG tablet Take 1 tablet by mouth Every Night. 90 tablet 2    pantoprazole (PROTONIX) 20 MG EC tablet Take 1 tablet by mouth Daily. 90 tablet 3    valsartan (DIOVAN) 320 MG tablet Take 1 tablet by mouth Daily. 90 tablet 3    FLUoxetine (PROzac) 40 MG capsule Take 2 capsules by mouth Daily. 60 capsule 0    hydrOXYzine (ATARAX) 10 MG tablet Take 1-3 tablets by mouth 2 (Two) Times a Day As Needed for Anxiety. 90 tablet 0    metoprolol tartrate (LOPRESSOR) 25 MG tablet Take 1 tablet by mouth 2 (Two) Times a Day. 180 tablet 2    mirtazapine (REMERON) 15 MG tablet Take 1 tablet by mouth Every Night. 30 tablet 0     No current facility-administered medications for this visit.       Mental Status Exam:   Hygiene:   good  Cooperation:  Cooperative  Eye Contact:  Good  Psychomotor Behavior:  Appropriate  Affect:  Full range  Hopelessness: Denies  Speech:  Normal  Thought Process:  Goal directed and Linear  Thought Content:  Normal  Suicidal:  None  Homicidal:  None  Hallucinations:  None  Delusion:  None  Memory:  Intact  Orientation:  Person, Place, Time, and  Situation  Reliability:  fair  Insight:  Fair  Judgement:  Good  Impulse Control:  Good  Physical/Medical Issues:  Yes See medical history        PHQ-9 Depression Screening  Little interest or pleasure in doing things?     Feeling down, depressed, or hopeless?     PHQ-2 Total Score     Trouble falling or staying asleep, or sleeping too much?     Feeling tired or having little energy?     Poor appetite or overeating?     Feeling bad about yourself - or that you are a failure or have let yourself or your family down?     Trouble concentrating on things, such as reading the newspaper or watching television?     Moving or speaking so slowly that other people could have noticed? Or the opposite - being so fidgety or restless that you have been moving around a lot more than usual?     Thoughts that you would be better off dead, or of hurting yourself in some way?     PHQ-9 Total Score     If you checked off any problems, how difficult have these problems made it for you to do your work, take care of things at home, or get along with other people?           CONSTANZA-7         The patient declined/did not complete the virtual PHQ-9 and CONSTANZA-7 for today's encounter.          Assessment & Plan   Diagnoses and all orders for this visit:    1. Severe episode of recurrent major depressive disorder, without psychotic features (Primary)  -     mirtazapine (REMERON) 15 MG tablet; Take 1 tablet by mouth Every Night.  Dispense: 30 tablet; Refill: 0  -     FLUoxetine (PROzac) 40 MG capsule; Take 2 capsules by mouth Daily.  Dispense: 60 capsule; Refill: 0    2. Generalized anxiety disorder  -     mirtazapine (REMERON) 15 MG tablet; Take 1 tablet by mouth Every Night.  Dispense: 30 tablet; Refill: 0  -     FLUoxetine (PROzac) 40 MG capsule; Take 2 capsules by mouth Daily.  Dispense: 60 capsule; Refill: 0  -     hydrOXYzine (ATARAX) 10 MG tablet; Take 1-3 tablets by mouth 2 (Two) Times a Day As Needed for Anxiety.  Dispense: 90 tablet; Refill:  0    3. Insomnia due to other mental disorder  -     mirtazapine (REMERON) 15 MG tablet; Take 1 tablet by mouth Every Night.  Dispense: 30 tablet; Refill: 0            Visit Diagnoses:    ICD-10-CM ICD-9-CM   1. Severe episode of recurrent major depressive disorder, without psychotic features  F33.2 296.33   2. Generalized anxiety disorder  F41.1 300.02   3. Insomnia due to other mental disorder  F51.05 300.9    F99 327.02       GOALS:  Short Term Goals: Patient will be compliant with medication, and patient will have no significant medication related side effects.  Patient will be engaged in psychotherapy as indicated.  Patient will report subjective improvement of symptoms.  Long term goals: To stabilize mood and treat/improve subjective symptoms, the patient will stay out of the hospital, the patient will be at an optimal level of functioning, and the patient will take all medications as prescribed.  The patient verbalized understanding and agreement with goals that were mutually set.      SUICIDE RISK ASSESSMENT: Unalterable demographics and a history of mental health intervention indicate this patient is in a high risk category compared to the general population. At present, the patient denies active SI/HI, intentions, or plans at this time and agrees to seek immediate care should such thoughts develop. The patient verbalizes understanding of how to access emergency care if needed and agrees to do so. Consideration of suicide risk and protective factors such as history, current presentation, individual strengths and weaknesses, psychosocial and environmental stressors and variables, psychiatric illness and symptoms, medical conditions and pain, took place in this interview. Based on those considerations, the patient is determined: within individual baseline and presenting no imminent risk for suicide or homicide. Other recommendations: The patient does not meet the criteria for inpatient admission and is not a  safety risk to self or others at today's visit. Inpatient treatment offers no significant advantages over outpatient treatment for this patient at today's visit.      SAFETY PLAN:  Patient was given ample time for questions and fully participated in treatment planning.  Patient was encouraged to call the clinic with any questions or concerns.  Patient was informed of access to emergency care. If patient were to develop any significant symptomatology, suicidal ideation, homicidal ideation, any concerns, or feel unsafe at any time they are to call the clinic and if unable to get immediate assistance should immediately call 911 or go to the nearest emergency room.  The patient is advised to remove or secure (lock away) all lethal weapons (including guns) and sharps (including razors, scissors, knives, etc.).  All medications (including any prescribed and any over the counter medications) should be stored in a safe and secured location that is not obtainable by children/adolescents.  Patient was given an opportunity and encouraged to ask questions about their medication, illness, and treatment. Patient contracted verbally for the following: If you are experiencing an emotional crisis or have thoughts of harming yourself or others, please go to your nearest local emergency room or call 911. Will continue to re-assess medication response and side effects frequently to establish efficacy and ensure safety. Risks, any black box warnings, side effects, off label usage, and benefits of medication and treatment discussed with patient, along with potential adverse side effects of current and/or newly prescribed medication, alternative treatment options, and OTC medications.  Patient verbalized understanding of potential risks, any off label use of medication, any black box warnings, and any side effects in their own words. The patient verbalized understanding and agreed to comply with the safety plan discussed in their own  words.  Patient given the number to the office. Number also available to the 24- hour suicide hotline.       TREATMENT PLAN/GOALS: Continue medications and treatment plan as indicated. Treatment and medication options discussed during today's visit. Patient acknowledged and verbally consented to continue with current treatment plan and was educated on the importance of compliance with treatment and follow-up appointments.        - Begin Hydroxyzine 10-30 mg twice daily as needed for anxiety  - Continue Prozac 80 mg daily for depression/anxiety  - Continue Remeron 15 mg nightly for insomnia and as adjunct for depression/anxiety.   Patient plans to resume this medication once she is no longer having to provide around-the-clock care for her mother.  - Rule out PTSD        MEDICATION ISSUES: Discussed medication options and treatment plan of prescribed medication, any off label use of medication, as well as the risks, benefits, any black box warnings including increased suicidality, and side effects including but not limited to potential falls, dizziness, possible impaired driving, GI side effects (change in appetite, abdominal discomfort, nausea, vomiting, diarrhea, and/or constipation), dry mouth, somnolence, sedation, insomnia, activation, agitation, irritation, tremors, abnormal muscle movements or disorders, headache, sweating, possible bruising or rare bleeding, electrolyte and/or fluid abnormalities, change in blood pressure/heart rate/and or heart rhythm, sexual dysfunction, and metabolic adversities among others. Patient and/or guardian agreeable to call the office with any worsening of symptoms or onset of side effects, or if any concerns or questions arise.  The contact information for the office is made available to the patient and/or guardian.  Patient and/or guardian agreeable to call 911 or go to the nearest ER should they begin having any SI/HI, or if any urgent concerns arise. No medication side effects  or related complaints today.    This APRN has discussed with the patient/guardian about the possibility of serotonin syndrome when certain medications are taken together, as is the case with this patient.  This APRN has provided the patient/guardian with a list of symptoms of serotonin syndrome including symptoms of autonomic instability, altered sensorium, confusion, restlessness, agitation, myoclonus, hyperreflexia, hyperthermia, diaphoresis, tremor, chills, diarrhea and cramps, ataxia, headache, migraines, seizures, and insomnia; which could lead to permanent hyperthermic brain damage, cardiovascular collapse, coma, or even death.  The patient/guardian are instructed to stop medications immediately and either contact this APRN/this office during regular office hours, or go to the emergency department/call 911, if they begin to experience any of the symptoms discussed.  The benefits and risks of the current medication regimen are discussed with the patient/guardian, and they feel that the benefits out weigh the risks.  The patient/guardian verbalized understanding and agreement in their own words.              VERBAL INFORMED CONSENT FOR MEDICATION:  The patient was educated that their proposed/prescribed psychotropic medication(s) has potential risks, side effects, adverse effects, and black box warnings; and these have been discussed with the patient.  The patient has been informed that their treatment and medication dosage is to be individualized, and may even be above or below the recommended range/dosage due to patient individualization and response, but medication is prescribed using a shared decision making approach, and no medication or dosage will be prescribed without the patient's verbal consent.  The reason for the use of the medication including any off label use and alternative modes of treatment other than or in addition to medication has been considered and discussed, the probable consequences of  not receiving the proposed treatment have been discussed, and any treatment side effects, black box warnings, and cautions associated with treatment have been discussed with the patient.  The patient is allowed ample time to openly discuss and ask questions regarding the proposed medication(s) and treatment plan and the patient verbalizes understanding the reasons for the use of the medication, its potential risks and benefits, other alternative treatment(s), and the probable consequences that may occur if the proposed medication is not given.  The patient has been given ample time to ask questions and study the information and find the information to be specific, accurate, and complete.  The patient gives verbal consent for the medication(s) proposed/prescribed, they verbalized understanding that they can refuse and withdraw consent at any time with the assistance of this APRN, and the patient has verbally confirmed that they are aware, and are willing, to take the prescribed medication and follow the treatment plan with the known possible risks, side effect, black box warnings, and any potential medication interactions, and the patient reports they will be worse off without this medication and treatment plan.  The patient is advised to contact this APRN/this office if any questions or concerns arise at any time (at [phone]), or call 911/go to the closest emergency department if needed or outside of office hours.       [facility] No Show Policy:  We understand unexpected circumstances arise; however, anytime you miss your appointment we are unable to provide you appropriate care.  In addition, each appointment missed could have been used to provide care for others.  We ask that you call at least 24 hours in advance to cancel or reschedule an appointment.  We would like to take this opportunity to remind you of our policy stating patients who miss THREE or more appointments without  cancelling or rescheduling 24 hours in advance of the appointment may be subject to cancellation of any further visits with our clinic and recommendation to seek in-person services/visits.    Please call [phone] to reschedule your appointment. If there are reasons that make it difficult for you to keep the appointments, please call and let us know how we can help.  Please understand that medication prescribing will not continue without seeing your provider.      [facility]'s No Show Policy reviewed with patient at today's visit. Patient verbalized understanding of this policy. Discussed with patient that in the event that there are three or more no show visits, it will be recommended that they pursue in-person services/visits as noncompliance with telehealth visits indicates that patient is not an appropriate candidate for telemedicine and would likely be more appropriate for in-person services/visits. Patient verbalizes understanding and is agreeable to this.           MEDS ORDERED DURING VISIT:  New Medications Ordered This Visit   Medications    mirtazapine (REMERON) 15 MG tablet     Sig: Take 1 tablet by mouth Every Night.     Dispense:  30 tablet     Refill:  0    FLUoxetine (PROzac) 40 MG capsule     Sig: Take 2 capsules by mouth Daily.     Dispense:  60 capsule     Refill:  0    hydrOXYzine (ATARAX) 10 MG tablet     Sig: Take 1-3 tablets by mouth 2 (Two) Times a Day As Needed for Anxiety.     Dispense:  90 tablet     Refill:  0       Return in about 4 weeks (around 6/3/2025), or if symptoms worsen or fail to improve, for Recheck.    The patient will follow-up in approximately 4 weeks, and follow-up appointment has been scheduled with the patient confirming agreeability/availability for date/time during today's encounter.  Instructed the patient to contact the [facility] if they have any questions or concerns, and that a sooner appointment will  be provided if needed.  The patient verbalized understanding in their own words and endorsed that they are agreeable to this.             Functional Status: Severe impairment    Prognosis: Fair with Ongoing Treatment             This document has been electronically signed by MERYL Sims  May 6, 2025 08:29 EDT      Some of the data in this electronic note has been brought forward from a previous encounter, any necessary changes have been made, it has been reviewed by this APRN, and it is accurate.       Part of this note may be an electronic transcription/translation of spoken language to printed text using the Dragon Dictation System.

## 2025-05-15 ENCOUNTER — TELEMEDICINE (OUTPATIENT)
Dept: PSYCHIATRY | Facility: CLINIC | Age: 45
End: 2025-05-15
Payer: COMMERCIAL

## 2025-05-15 DIAGNOSIS — F41.1 GENERALIZED ANXIETY DISORDER: Primary | ICD-10-CM

## 2025-05-15 DIAGNOSIS — F33.1 MAJOR DEPRESSIVE DISORDER, RECURRENT EPISODE, MODERATE: ICD-10-CM

## 2025-05-15 NOTE — PROGRESS NOTES
Mode of Visit: Video  Location of patient: -HOME-  Location of provider: +HOME+  You have chosen to receive care through a telehealth visit.  The patient has signed the video visit consent form.  The visit included audio and video interaction. No technical issues occurred during this visit.    Date: May 15, 2025  Time In: 09:02 AM  Time out: 9:25 AM    This provider is located at [facility] [place] [facility], [address] [place] [address], [facility], using a secure [employer] Video Visit through [employer]. Patient is being seen remotely via telehealth at their home address is located in [place]. Patient stated they are in a secure environment for this session. The patient's condition being diagnosed and treated is appropriate for telemedicine. The provider identified themself as well as their credentials. The patient, or  patient's legal guardian consent to be seen remotely, and when consent is given they understand that the consent allows for patient identifiable information to be sent to a third party as needed. They may refuse to be seen remotely at any time. The electronic data is encrypted and password protected, and the patient's or  legal guardian has been advised of the potential risks to privacy not withstanding such measures.   PT Identifiers used: Name and .    You have chosen to receive care through a telehealth visit.  Do you consent to use a video/audio connection for your medical care today? Yes    Subjective   Samina Maya is a 45 y.o. female who presents today for follow up    Chief Complaint:   Chief Complaint   Patient presents with    Anxiety    Depression        History of Present Illness:   Anxiety  Visit:  Follow-up  Initial visit:     PMH includes: depression      Symptoms: depressed mood, excessive worry, insomnia, irritability, malaise/fatigue, muscle tension, nervous/anxious and restlessness      Frequency:  Most days    Severity:  Interfering with daily activities    Sleep  quality:  Non-restorative    Nighttime awakenings:  Often    Treatments tried:  SSRIs, non-benzodiazephine anxiolytics and lifestyle changes    Improvement on treatment:  Moderate  Depression  Visit:  Follow-up  Follow-up Visit:     Medication compliance:  %    Symptoms: depressed mood, excessive worry, insomnia, irritability, malaise/fatigue, muscle tension and nervousness/anxious      Frequency:  Most days    Severity:  Interfering with daily activities    Sleep quality:  Non-restorative    Nighttime awakenings:     PMH Includes: depression      Treatment tried:  SSRI, non-benzodiazephine anxiolytics, medications and lifestyle changes    Improvement on treatment:  Moderate         Clinical Maneuvering/Intervention:      Assisted patient in processing above session content; acknowledged and normalized patient’s thoughts, feelings, and concerns.  Rationalized patient thought process regarding concerns presented at session.  Discussed triggers associated with patient's  anxiety  and depression  Also discussed coping skills for patient to implement such as self care  and positive self talk     Allowed patient to freely discuss issues without interruption or judgment. Provided safe, confidential environment to facilitate the development of positive therapeutic relationship and encourage open, honest communication. Assisted patient in identifying risk factors which would indicate the need for higher level of care including thoughts to harm self or others and/or self-harming behavior and encouraged patient to contact this office, call 911, or present to the nearest emergency room should any of these events occur. Discussed crisis intervention services and means to access. Patient adamantly and convincingly denies current suicidal or homicidal ideation or perceptual disturbance.    Assessment: Patient reported no active suicidal ideations, self-injurious behaviors, or homicidal ideations.  Patient reported she is  "\"sleeping better but I have to get up every 4 hours with mom.\"  Patient reported average appetite.  Therapist continued to use Rogerian focused interventions with patient to build rapport and to provide unconditional positive regard.  Patient and therapist continue to explore triggers for anxiety and depression with patient identifying providing mother with the end of life care at this time as primary trigger.  Patient and therapist explored and processed this trigger while discussing appropriate coping strategies including the importance of self-care at this time, as well as the use of cognitive behavioral strategies to assist patient in refuting negative thought processes.  Patient requested session due to needing to return to providing care for her mother.    Patient appears to maintain relative stability as compared to their baseline.  However, patient continues to struggle with   Chief Complaint   Patient presents with    Anxiety    Depression    which continues to cause impairment in important areas of functioning.  A result, they can be reasonably expected to continue to benefit from treatment and would likely be at increased risk for decompensation otherwise.      PHQ-Score Total:  PHQ-9 Total Score: 10    CONSTANZA-7 Score Total:  Over the last two weeks, how often have you been bothered by the following problems?  Feeling nervous, anxious or on edge: Nearly every day  Not being able to stop or control worrying: More than half the days  Worrying too much about different things: More than half the days  Trouble Relaxing: Nearly every day  Being so restless that it is hard to sit still: Nearly every day  Becoming easily annoyed or irritable: Nearly every day  Feeling afraid as if something awful might happen: Several days  CONSTANZA 7 Total Score: 17  If you checked any problems, how difficult have these problems made it for you to do your work, take care of things at home, or get along with other people: Very " difficult      MENTAL STATUS EXAM   General Appearance:  Cleanly groomed and dressed and well developed  Eye Contact:  Good eye contact  Attitude:  Cooperative and polite  Motor Activity:  Normal gait, posture  Muscle Strength:  Normal  Speech:  Normal rate, tone, volume  Language:  Stereotypical  Mood and affect:  Normal, pleasant, appropriate, mood congruent and euthymic  Hopelessness:  Denies  Loneliness: 6  Thought Process:  Logical and goal-directed  Associations/ Thought Content:  No delusions  Hallucinations:  None  Suicidal Ideations:  Not present  Homicidal Ideation:  Not present  Sensorium:  Alert and clear  Orientation:  Person, place, time and situation  Immediate Recall, Recent, and Remote Memory:  Intact  Attention Span/ Concentration:  Good  Fund of Knowledge:  Appropriate for age and educational level  Intellectual Functioning:  Average range  Insight:  Good  Judgement:  Good  Reliability:  Good  Impulse Control:  Good         Patient's Support Network Includes:  extended family    Progress toward goal: Not at goal    Prognosis: Good with Ongoing Treatment     Plan: Patient will continue ongoing therapeutic mental health services to assist with stabilization and overall improved quality of life through the use of coping skills and psychoeducation.    Patient will continue in individual outpatient therapy with focus on improved functioning and coping skills, maintaining stability, and avoiding decompensation and the need for higher level of care.    Patient will adhere to medication regimen as prescribed and report any side effects. Patient will contact this office, call 911 or present to the nearest emergency room should suicidal or homicidal ideations occur. Provide Cognitive Behavioral Therapy and Solution Focused Therapy to improve functioning, maintain stability, and avoid decompensation and the need for higher level of care.     Return in about 3 weeks (around 6/5/2025) for Video visit, Next  scheduled follow up.      VISIT DIAGNOSIS:    Diagnosis Plan   1. Generalized anxiety disorder        2. Major depressive disorder, recurrent episode, moderate                      This document has been electronically signed by SHIVAM Meneses  May 15, 2025      Part of this note may be an electronic transcription/translation of spoken language to printed text using the Dragon Dictation System.

## 2025-05-16 RX ORDER — ASPIRIN 81 MG/1
81 TABLET ORAL DAILY
Qty: 90 TABLET | Refills: 3 | Status: SHIPPED | OUTPATIENT
Start: 2025-05-16

## 2025-06-03 ENCOUNTER — TELEPHONE (OUTPATIENT)
Dept: CARDIOLOGY | Facility: CLINIC | Age: 45
End: 2025-06-03
Payer: COMMERCIAL

## 2025-06-03 ENCOUNTER — TELEMEDICINE (OUTPATIENT)
Dept: PSYCHIATRY | Facility: CLINIC | Age: 45
End: 2025-06-03
Payer: COMMERCIAL

## 2025-06-03 DIAGNOSIS — F51.05 INSOMNIA DUE TO OTHER MENTAL DISORDER: Chronic | ICD-10-CM

## 2025-06-03 DIAGNOSIS — F41.1 GENERALIZED ANXIETY DISORDER: Chronic | ICD-10-CM

## 2025-06-03 DIAGNOSIS — F99 INSOMNIA DUE TO OTHER MENTAL DISORDER: Chronic | ICD-10-CM

## 2025-06-03 DIAGNOSIS — F33.2 SEVERE EPISODE OF RECURRENT MAJOR DEPRESSIVE DISORDER, WITHOUT PSYCHOTIC FEATURES: Primary | Chronic | ICD-10-CM

## 2025-06-03 RX ORDER — HYDROXYZINE HYDROCHLORIDE 50 MG/1
50 TABLET, FILM COATED ORAL 2 TIMES DAILY PRN
Qty: 60 TABLET | Refills: 0 | Status: SHIPPED | OUTPATIENT
Start: 2025-06-03

## 2025-06-03 RX ORDER — MIRTAZAPINE 15 MG/1
15 TABLET, FILM COATED ORAL NIGHTLY
Qty: 30 TABLET | Refills: 0 | Status: SHIPPED | OUTPATIENT
Start: 2025-06-03

## 2025-06-03 RX ORDER — FLUOXETINE HYDROCHLORIDE 40 MG/1
80 CAPSULE ORAL DAILY
Qty: 60 CAPSULE | Refills: 0 | Status: SHIPPED | OUTPATIENT
Start: 2025-06-03

## 2025-06-03 NOTE — TELEPHONE ENCOUNTER
LVM FOR PT TO CALL BACK AND RESCHEDULE APPOINTMENT ON 7/28/25    HUB CAN RESCHEDULE EARLIER IN JULY OR HANNY

## 2025-06-03 NOTE — PROGRESS NOTES
This provider is located at home office working remotely through the [facility] (through [facility]), [address], [place] [place] [facility] [place], [address] using a secure [employer] Video Visit through [employer]. Patient is being seen remotely via telehealth at their home address in [place], and stated they are in a secure environment for this session. The patient's condition being diagnosed/treated is appropriate for telemedicine. The provider identified herself as well as her credentials. The patient, and/or patients guardian, consent to be seen remotely, and when consent is given they understand that the consent allows for patient identifiable information to be sent to a third party as needed. They may refuse to be seen remotely at any time. The electronic data is encrypted and password protected, and the patient and/or guardian has been advised of the potential risks to privacy not withstanding such measures.    You have chosen to receive care through a telehealth visit.  Do you consent to use a video/audio connection for your medical care today? Yes     Patient identifiers utilized: Name and date of birth.    Patient verbally confirmed consent for today's encounter 2025.    The patient does verbally confirm they are being seen today while physically located in the [facility].  This provider/this APRN is licensed in the [facility] where the patient is located/being seen.         Subjective   Samina Maya is a 45 y.o. female who is here today for medication management follow up.    Chief Complaint: Depression, anxiety, insomnia     Accompanied by: Self - no one else besides the patient present at today's encounter      History of Present Illness:  -Last visit with this APRN: 2025  -Since last encounter with this APRN/Office: The patient states that her mother passed away and they had her  services and burial yesterday.  She states that she cared for her  "mother the entire time that she was ill, which she endorses was 52 days.  She states that it was really difficult to watch her mother declines a rapidly and eventually passed away.  She states that it was difficult for her to be the support person for her family.  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  She states that her family had a really difficult time accepting everything, but states that given her background and healthcare she was more aware of what to expect and what was going on.  She states that even so it has been difficult for her to acclimate to losing her mother.  She states that she misses both her mother and her son, but states that she finds solace in the fact that they are together now and that her mother is no longer sick or in pain.  She states that depression and anxiety symptoms have been \"all over the place\" due to losing her mother and her grief related to this, but states that she feels her current medication regimen has been helpful for her given recent circumstances.  She states that she has been able to restart taking the Remeron again now that her mother has passed away as she endorses that she is no longer having to wake up throughout the night to care for her mother.  She states that she has been using the hydroxyzine and states that she feels this has been somewhat helpful for her.    -Appetite reported as: \"a little better\".  Reports that overall appetite has been improving somewhat as she has resumed the Remeron.  Reports that grieving the loss of her mother has continued to affect her appetite somewhat, but states that she thinks this will improve as she continues to progress through her grief and acclimates to her mother no longer being here.  -Sleep reported " "as: \"a little better\".  Reports that overall sleep has been improving somewhat as she has resumed the Remeron.  Reports that grieving the loss of her mother has continued to affect her sleep somewhat, but states that she thinks this will improve as she continues to progress through her grief and acclimates to her mother no longer being here.    -Changes in medications or new medical problems/concerns since last visit: Denies  -Reported medication compliance: The patient reports that she has been taking a total of 50 mg of hydroxyzine daily in a single dose rather than prescribed dosing regimen of 10-30 mg twice daily as needed for anxiety.  The patient otherwise reports compliance with their current psychotropic medication regimen.    -Reported medication side effects or concerns: Denies    -Auditory or visual hallucinations: Denies  -Behaviors different from patient baseline, or any reckless, impulsive, or risky behaviors: Denies  -Symptoms of sherron or psychosis: Denies  -Self-injurious behavior: Denies  -SI/HI: The patient adamantly denies any suicidal or homicidal ideations, plans, or intent at the time of this encounter and is convincing.  -No abnormal muscle movements or tics noted by this APRN during today's encounter, and the patient denies any of these symptoms.      -Using a shared decision-making approach the patient reports they would like to continue their current treatment/medication regimen without any adjustments/changes at this time.  When discussing medication efficacy with the patient, and reassessing the need and appropriateness of continued psychotropic medication treatment and doses, they report they are pleased with management of symptoms at this time, and that their current treatment/medication regimen has continued to be effective for them and they do not want to make any changes or adjustments at today's encounter.    -The patient does verbally contract for safety at today's encounter and is " "in verbal agreement with the safety/crisis plan. The patient reports in their own words that they will reach out to this APRN/office prior to next scheduled appointment if there is any worsening of mood, any new psychiatric symptoms, any medication side effects or concerns, any concern for safety to self or others, any suicidal or homicidal ideations plans or intent, or any concerns, or they will call 911, call or text the suicide and crisis lifeline at [phone], or go to the closest emergency department.        Buried mom yesterday -   Cared for her entire time       5 hydroxyzine daily -   A little bit - but not much             LMP:  Postmenopausal.  Denies any chance of pregnancy at this time.   The patient was educated that her prescribed medications can have potential risk to a developing fetus. The patient is advised to contact this APRN/this office if she becomes pregnant or plans to become pregnant.  Pt verbalizes understanding and acknowledged agreement with this plan in her own words.       Prior Psychiatric Medications:  Klonopin - reports very effective for her.  Reports that she felt this medication gave her motivation and \"a boost\" and helped her to want to get up and do things.  Reports that this medication was discontinued in 2016 after she had to transfer care due to previous psychiatrist moving out of state and PCP being unwilling to continue both her pain medication/opiates and Klonopin concurrently.    Valium - reports ineffective  Xanax - reports \"made her mean\"      The following portions of the patient's history were reviewed and updated as appropriate: allergies, current medications, past family history, past medical history, past social history, past surgical history, and problem list.    Review of Systems   Constitutional:  Negative for activity change, appetite change, fatigue and unexpected weight change.   Psychiatric/Behavioral:  Positive for dysphoric mood. Negative for agitation, " behavioral problems, confusion, decreased concentration, hallucinations, self-injury, sleep disturbance and suicidal ideas. The patient is nervous/anxious. The patient is not hyperactive.        Objective   Physical Exam  Constitutional:       Appearance: Normal appearance.   Neurological:      Mental Status: She is alert.   Psychiatric:         Attention and Perception: Attention and perception normal.         Mood and Affect: Affect normal. Mood is anxious and depressed.         Speech: Speech normal.         Behavior: Behavior normal. Behavior is cooperative.         Thought Content: Thought content normal. Thought content does not include homicidal or suicidal ideation. Thought content does not include homicidal or suicidal plan.         Cognition and Memory: Cognition and memory normal.         Judgment: Judgment normal.       There were no vitals taken for this visit.    The patient was seen remotely today via a MyChart Video Visit through [employer].  Unable to obtain vital signs due to nature of remote visit.  Height stated at 65 inches.  Weight stated at 129 pounds.         Allergies   Allergen Reactions    Lisinopril Swelling    Morphine Nausea And Vomiting     Only pill form    Doxycycline Nausea And Vomiting    Aspirin Nausea And Vomiting    Duloxetine Unknown - Low Severity     Skin high sensitive; felt like skin was crawling    Hydrocodone Nausea And Vomiting     HA    Methadone Nausea And Vomiting    Pregabalin Swelling       No results found for this or any previous visit (from the past 12 weeks).      Current Medications:   Current Outpatient Medications   Medication Sig Dispense Refill    albuterol sulfate  (90 Base) MCG/ACT inhaler Inhale 2 puffs Every 4 (Four) Hours As Needed for Wheezing. 18 g 11    aspirin 81 MG EC tablet Take 1 tablet by mouth Daily. 90 tablet 3    atorvastatin (LIPITOR) 40 MG tablet Take 1 tablet by mouth Every Night. 90 tablet 2    FLUoxetine (PROzac) 40 MG capsule Take 2  capsules by mouth Daily. 60 capsule 0    hydrOXYzine (ATARAX) 50 MG tablet Take 1 tablet by mouth 2 (Two) Times a Day As Needed for Anxiety. 60 tablet 0    metoprolol tartrate (LOPRESSOR) 25 MG tablet Take 1 tablet by mouth 2 (Two) Times a Day. 180 tablet 2    mirtazapine (REMERON) 15 MG tablet Take 1 tablet by mouth Every Night. 30 tablet 0    pantoprazole (PROTONIX) 20 MG EC tablet Take 1 tablet by mouth Daily. 90 tablet 3    valsartan (DIOVAN) 320 MG tablet Take 1 tablet by mouth Daily. 90 tablet 3     No current facility-administered medications for this visit.       Mental Status Exam:   Hygiene:   good  Cooperation:  Cooperative  Eye Contact:  Good  Psychomotor Behavior:  Appropriate  Affect:  Full range  Hopelessness: Denies  Speech:  Normal  Thought Process:  Goal directed and Linear  Thought Content:  Normal  Suicidal:  None  Homicidal:  None  Hallucinations:  None  Delusion:  None  Memory:  Intact  Orientation:  Person, Place, Time, and Situation  Reliability:  good  Insight:  Good  Judgement:  Good  Impulse Control:  Good  Physical/Medical Issues:  Yes See medical history        PHQ-9 Depression Screening  Little interest or pleasure in doing things?     Feeling down, depressed, or hopeless?     PHQ-2 Total Score     Trouble falling or staying asleep, or sleeping too much?     Feeling tired or having little energy?     Poor appetite or overeating?     Feeling bad about yourself - or that you are a failure or have let yourself or your family down?     Trouble concentrating on things, such as reading the newspaper or watching television?     Moving or speaking so slowly that other people could have noticed? Or the opposite - being so fidgety or restless that you have been moving around a lot more than usual?     Thoughts that you would be better off dead, or of hurting yourself in some way?     PHQ-9 Total Score     If you checked off any problems, how difficult have these problems made it for you to do  your work, take care of things at home, or get along with other people?           CONSTANZA-7         The patient declined/did not complete the virtual PHQ-9 and CONSTANZA-7 for today's encounter.          Assessment & Plan   Diagnoses and all orders for this visit:    1. Severe episode of recurrent major depressive disorder, without psychotic features (Primary)  -     FLUoxetine (PROzac) 40 MG capsule; Take 2 capsules by mouth Daily.  Dispense: 60 capsule; Refill: 0  -     mirtazapine (REMERON) 15 MG tablet; Take 1 tablet by mouth Every Night.  Dispense: 30 tablet; Refill: 0    2. Generalized anxiety disorder  -     hydrOXYzine (ATARAX) 50 MG tablet; Take 1 tablet by mouth 2 (Two) Times a Day As Needed for Anxiety.  Dispense: 60 tablet; Refill: 0  -     FLUoxetine (PROzac) 40 MG capsule; Take 2 capsules by mouth Daily.  Dispense: 60 capsule; Refill: 0  -     mirtazapine (REMERON) 15 MG tablet; Take 1 tablet by mouth Every Night.  Dispense: 30 tablet; Refill: 0    3. Insomnia due to other mental disorder  -     mirtazapine (REMERON) 15 MG tablet; Take 1 tablet by mouth Every Night.  Dispense: 30 tablet; Refill: 0              Visit Diagnoses:    ICD-10-CM ICD-9-CM   1. Severe episode of recurrent major depressive disorder, without psychotic features  F33.2 296.33   2. Generalized anxiety disorder  F41.1 300.02   3. Insomnia due to other mental disorder  F51.05 300.9    F99 327.02         GOALS:  Short Term Goals: Patient will be compliant with medication, and patient will have no significant medication related side effects.  Patient will be engaged in psychotherapy as indicated.  Patient will report subjective improvement of symptoms.  Long term goals: To stabilize mood and treat/improve subjective symptoms, the patient will stay out of the hospital, the patient will be at an optimal level of functioning, and the patient will take all medications as prescribed.  The patient verbalized understanding and agreement with goals that  were mutually set.          TREATMENT PLAN/GOALS: Continue medications and treatment plan as indicated. Treatment and medication options discussed during today's visit. Patient acknowledged and verbally consented to continue with current treatment plan and was educated on the importance of compliance with treatment and follow-up appointments.        - Continue Prozac 80 mg daily for depression/anxiety  - Continue Remeron 15 mg nightly for insomnia and as adjunct for depression/anxiety  - Continue Hydroxyzine 50 mg twice daily as needed for anxiety  - Rule out PTSD        MEDICATION ISSUES: Discussed treatment plan and medication options of prescribed medication, including any off label use of medication, as well as the risks, benefits, black box warnings, and side effects including sedation or drowsiness and potential falls, possible impaired driving, worsened or change in mood, suicidal and or homicidal ideations, changes in weight, and metabolic adversities among others. Patient is agreeable to call the office with any worsening of symptoms or onset of side effects, or if any concerns or questions arise.  The contact information for the office is available to the patient, telephone number [phone]. Patient is agreeable to call 911 or go to the nearest emergency department should they begin having any suicidal or homicidal ideations, plans, or intent, or if any urgent concerns arise. No medication side effects or related complaints today.      Important educational information made available and provided in after visit summary for patient to review.     Due to the nature of virtual visits and inability to monitor vital signs and weight with virtual visits, the patient has been encouraged to monitor their vital signs and weight regularly either through self-monitoring via home device(s) or with their Primary Care Provider, and the patient has been instructed to notify this APRN of any abnormalities or changes  from baseline.     Patient aware of limitations of provider's availability and office hours, and how to reach provider/office if needed (office number for patient to call for any questions/concerns: [phone]). If the patient's needs require more frequent or intensive management/monitoring than can be provided from this provider utilizing a strictly virtual platform, or care that is outside of this provider's scope, then patient may be referred to more appropriate provider or modality.                       VERBAL INFORMED CONSENT FOR MEDICATION:  The patient was educated that their proposed/prescribed psychotropic medication(s) has potential risks, side effects, adverse effects, and black box warnings; and these have been discussed with the patient.  The patient has been informed that their treatment and medication dosage is to be individualized, and may even be above or below the recommended range/dosage due to patient individualization and response, but medication is prescribed using a shared decision making approach, and no medication or dosage will be prescribed without the patient's verbal consent.  The reason for the use of the medication including any off label use and alternative modes of treatment other than or in addition to medication has been considered and discussed, the probable consequences of not receiving the proposed treatment have been discussed, and any treatment side effects, black box warnings, and cautions associated with treatment have been discussed with the patient.  The patient is allowed ample time to openly discuss and ask questions regarding the proposed medication(s) and treatment plan and the patient verbalizes understanding the reasons for the use of the medication, its potential risks and benefits, other alternative treatment(s), and the probable consequences that may occur if the proposed medication is not given.  The patient has been given ample time to ask questions and study  the information and find the information to be specific, accurate, and complete.  The patient gives verbal consent for the medication(s) proposed/prescribed, they verbalized understanding that they can refuse and withdraw consent at any time with the assistance of this APRN, and the patient has verbally confirmed that they are aware, and are willing, to take the prescribed medication and follow the treatment plan with the known possible risks, side effect, black box warnings, and any potential medication interactions, and the patient reports they will be worse off without this medication and treatment plan.  The patient is advised to contact this APRN/this office if any questions or concerns arise at any time (at [phone]), or call 911/go to the closest emergency department if needed or outside of office hours.        SUICIDE RISK ASSESSMENT AND SAFETY PLAN: Unalterable demographics and a history of mental health intervention indicate this patient is in a high risk category compared to the general population. At present, the patient denies active SI/HI, intentions, or plans at this time and agrees to seek immediate care should such thoughts develop. The patient verbalizes understanding of how to access emergency care if needed and agrees to do so. Consideration of suicide risk and protective factors such as history, current presentation, individual strengths and weaknesses, psychosocial and environmental stressors and variables, psychiatric illness and symptoms, medical conditions and pain, took place in this interview. Based on those considerations, the patient is determined: within individual baseline and presenting no imminent risk for suicide or homicide. Other recommendations: The patient does not meet the criteria for inpatient admission and is not a safety risk to self or others at today's visit. Inpatient treatment offers no significant advantages over outpatient treatment for this patient at today's  visit.  The patient was given ample time for questions and fully participated in treatment planning.  The patient was encouraged to call the clinic with any questions or concerns.  The patient was informed of access to emergency care. If patient were to develop any significant symptomatology, suicidal ideation, homicidal ideation, any concerns, or feel unsafe at any time they are to call the clinic and if unable to get immediate assistance should immediately call 911 or go to the nearest emergency room.  Patient contracted verbally for the following: If you are experiencing an emotional crisis or have thoughts of harming yourself or others, please go to your nearest local emergency room or call 911. Will continue to re-assess medication response and side effects frequently to establish efficacy and ensure safety. Risks, any black box warnings, side effects, off label usage, and benefits of medication and treatment discussed with patient, along with potential adverse side effects of current and/or newly prescribed medication, alternative treatment options, and OTC medications.  Patient verbalized understanding of potential risks, any off label use of medication, any black box warnings, and any side effects in their own words. The patient verbalized understanding and agreed to comply with the safety plan discussed in their own words.  Patient given the number to the office. Number also discussed of the 24- hour suicide hotline.           MEDS ORDERED DURING VISIT:  New Medications Ordered This Visit   Medications    hydrOXYzine (ATARAX) 50 MG tablet     Sig: Take 1 tablet by mouth 2 (Two) Times a Day As Needed for Anxiety.     Dispense:  60 tablet     Refill:  0    FLUoxetine (PROzac) 40 MG capsule     Sig: Take 2 capsules by mouth Daily.     Dispense:  60 capsule     Refill:  0    mirtazapine (REMERON) 15 MG tablet     Sig: Take 1 tablet by mouth Every Night.     Dispense:  30 tablet     Refill:  0       Future  Appointments         Provider Department Center    6/5/2025 8:00 AM Elisabeth Nava LPCC [facility]    6/20/2025 10:00 AM Elisabeth Nava LPCC [facility]    6/24/2025 9:00 AM (Arrive by 8:45 AM) Sharlene Dawn MD [facility]    6/30/2025 2:30 PM Christine Oneil APRN [facility]    7/28/2025 9:30 AM (Arrive by 9:15 AM) Priyank Day MD [facility] [place]    9/4/2025 11:15 AM (Arrive by 11:00 AM) Sudhir aGn APRN [facility] [place]              Return in about 4 weeks (around 7/1/2025), or if symptoms worsen or fail to improve, for Recheck.    The patient will follow-up in approximately 4 weeks, and follow-up appointment has been scheduled with the patient confirming agreeability/availability for date/time during today's encounter.  Instructed the patient to contact the [facility] if they have any questions or concerns, and that a sooner appointment will be provided if needed.  The patient verbalized understanding in their own words and endorsed that they are agreeable to this.             Functional Status: Moderate impairment     Prognosis: Fair with Ongoing Treatment             This document has been electronically signed by MERYL Sims  Snehal 3, 2025 15:39 EDT      Some of the data in this electronic note has been brought forward from a previous encounter, any necessary changes have been made, it has been reviewed by this APRN, and it is accurate.       Part of this note may be an electronic transcription/translation of spoken language to printed text using the Dragon Dictation System.

## 2025-06-05 ENCOUNTER — TELEMEDICINE (OUTPATIENT)
Dept: PSYCHIATRY | Facility: CLINIC | Age: 45
End: 2025-06-05
Payer: COMMERCIAL

## 2025-06-05 DIAGNOSIS — F41.1 GENERALIZED ANXIETY DISORDER: Primary | ICD-10-CM

## 2025-06-05 DIAGNOSIS — F33.0 MAJOR DEPRESSIVE DISORDER, RECURRENT EPISODE, MILD: ICD-10-CM

## 2025-06-05 NOTE — PROGRESS NOTES
Mode of Visit: Video  Location of patient: -HOME-  Location of provider: +HOME+  You have chosen to receive care through a telehealth visit.  The patient has signed the video visit consent form.  The visit included audio and video interaction. No technical issues occurred during this visit.    Date: 2025  Time In: 08:00 AM  Time out: 8:42 AM    This provider is located at [facility], [address] [place] [address], [facility], using a secure [employer] Video Visit through [employer]Â®. Patient is being seen remotely via telehealth at their home address is located in [place]. Patient stated they are in a secure environment for this session. The patient's condition being diagnosed and treated is appropriate for telemedicine. The provider identified themself as well as their credentials. The patient, or  patient's legal guardian consent to be seen remotely, and when consent is given they understand that the consent allows for patient identifiable information to be sent to a third party as needed. They may refuse to be seen remotely at any time. The electronic data is encrypted and password protected, and the patient's or  legal guardian has been advised of the potential risks to privacy not withstanding such measures.   PT Identifiers used: Name and .    You have chosen to receive care through a telehealth visit.  Do you consent to use a video/audio connection for your medical care today? Yes    Subjective   Samina Maya is a 45 y.o. female who presents today for follow up    Chief Complaint:   Chief Complaint   Patient presents with    Anxiety    Depression        History of Present Illness:   Anxiety  Visit:  Follow-up  Initial visit:     PMH includes: depression    Follow-up visit:     Medication compliance:  %    Symptoms: decreased concentration, depressed mood, excessive worry, irritability, muscle tension, nervous/anxious and restlessness      Frequency:  Most days     Severity:  Interfering with daily activities    Sleep quality:  Fair    Nighttime awakenings:  Seldom    Treatments tried:  SSRIs, non-benzodiazephine anxiolytics and lifestyle changes    Improvement on treatment:  Mild  Depression  Visit:  Follow-up  Follow-up Visit:     Medication compliance:  %    Symptoms: decreased concentration, depressed mood, excessive worry, irritability, muscle tension and nervousness/anxious      Frequency:  Most days    Severity:  Mild    Sleep quality:  Fair    Nighttime awakenings:  Seldom    PMH Includes: depression      Treatment tried:  SSRI, non-benzodiazephine anxiolytics, medications and lifestyle changes    Improvement on treatment:  Mild         Clinical Maneuvering/Intervention:      Assisted patient in processing above session content; acknowledged and normalized patient’s thoughts, feelings, and concerns.  Rationalized patient thought process regarding concerns presented at session.  Discussed triggers associated with patient's  anxiety  and depression  Also discussed coping skills for patient to implement such as increasing activity , self care , positive self talk , and cognitive behavioral strategies.     Allowed patient to freely discuss issues without interruption or judgment. Provided safe, confidential environment to facilitate the development of positive therapeutic relationship and encourage open, honest communication. Assisted patient in identifying risk factors which would indicate the need for higher level of care including thoughts to harm self or others and/or self-harming behavior and encouraged patient to contact this office, call 911, or present to the nearest emergency room should any of these events occur. Discussed crisis intervention services and means to access. Patient adamantly and convincingly denies current suicidal or homicidal ideation or perceptual disturbance.    Assessment: Patient reported no active suicidal ideations, self-injurious  behaviors, or homicidal ideations. Patient reported improvement in sleeping patterns and appetite. Therapist continued to use Rogerian-focused interventions with patient to build rapport and to provide unconditional positive regard.  Patient and therapist continue to explore and process triggers for anxiety and depression with patient identifying mother's passing around 2 weeks ago was primary trigger.  Patient and therapist explored and processed this trigger while discussing appropriate coping strategies including importance of self-care at this time, the use of positive daily self affirmations, increased involvement in enjoyed activities, and the use of cognitive behavioral strategies to assist her in identifying and refuting negative thought processes.    Patient appears to maintain relative stability as compared to their baseline.  However, patient continues to struggle with   Chief Complaint   Patient presents with    Anxiety    Depression    which continues to cause impairment in important areas of functioning.  A result, they can be reasonably expected to continue to benefit from treatment and would likely be at increased risk for decompensation otherwise.    PHQ-9 Depression Screening  Little interest or pleasure in doing things? Several days   Feeling down, depressed, or hopeless? Several days   PHQ-2 Total Score 2   Trouble falling or staying asleep, or sleeping too much? Several days   Feeling tired or having little energy? Several days   Poor appetite or overeating? Several days   Feeling bad about yourself - or that you are a failure or have let yourself or your family down? Not at all   Trouble concentrating on things, such as reading the newspaper or watching television? Several days   Moving or speaking so slowly that other people could have noticed? Or the opposite - being so fidgety or restless that you have been moving around a lot more than usual? Not at all     Thoughts that you would be better  "off dead, or of hurting yourself in some way? Not at all   PHQ-9 Total Score 6   If you checked off any problems, how difficult have these problems made it for you to do your work, take care of things at home, or get along with other people? Somewhat difficult           CONSTANZA-7 Score Total:  Over the last two weeks, how often have you been bothered by the following problems?  Feeling nervous, anxious or on edge: More than half the days (\"on edge.\")  Not being able to stop or control worrying: Nearly every day  Worrying too much about different things: Nearly every day  Trouble Relaxing: More than half the days  Being so restless that it is hard to sit still: Nearly every day  Becoming easily annoyed or irritable: More than half the days  Feeling afraid as if something awful might happen: More than half the days  CONSTANZA 7 Total Score: 17  If you checked any problems, how difficult have these problems made it for you to do your work, take care of things at home, or get along with other people: Very difficult      MENTAL STATUS EXAM   General Appearance:  Cleanly groomed and dressed and well developed  Eye Contact:  Good eye contact  Attitude:  Cooperative and polite  Motor Activity:  Normal gait, posture  Muscle Strength:  Normal  Speech:  Normal rate, tone, volume  Language:  Stereotypical  Mood and affect:  Normal, pleasant, appropriate, mood congruent and euthymic  Hopelessness:  Denies  Loneliness: Denies  Thought Process:  Logical and goal-directed  Associations/ Thought Content:  No delusions  Hallucinations:  None  Suicidal Ideations:  Not present  Homicidal Ideation:  Not present  Sensorium:  Alert and clear  Orientation:  Person, place, time and situation  Immediate Recall, Recent, and Remote Memory:  Intact  Attention Span/ Concentration:  Good  Fund of Knowledge:  Appropriate for age and educational level  Intellectual Functioning:  Average range  Insight:  Good  Judgement:  Good  Reliability:  Good  Impulse " Control:  Good         Patient's Support Network Includes:  extended family    Progress toward goal: Not at goal    Prognosis: Good with Ongoing Treatment     Plan: Patient will continue ongoing therapeutic mental health services to assist with stabilization and overall improved quality of life through the use of coping skills and psychoeducation.       Patient will continue in individual outpatient therapy with focus on improved functioning and coping skills, maintaining stability, and avoiding decompensation and the need for higher level of care.    Patient will adhere to medication regimen as prescribed and report any side effects. Patient will contact this office, call 911 or present to the nearest emergency room should suicidal or homicidal ideations occur. Provide Cognitive Behavioral Therapy and Solution Focused Therapy to improve functioning, maintain stability, and avoid decompensation and the need for higher level of care.     Return in about 2 weeks (around 6/19/2025) for Video visit, Next scheduled follow up.      VISIT DIAGNOSIS:    Diagnosis Plan   1. Generalized anxiety disorder        2. Major depressive disorder, recurrent episode, mild                      This document has been electronically signed by SHIVAM Meneses  June 5, 2025      Part of this note may be an electronic transcription/translation of spoken language to printed text using the Dragon Dictation System.

## 2025-06-20 ENCOUNTER — TELEMEDICINE (OUTPATIENT)
Dept: PSYCHIATRY | Facility: CLINIC | Age: 45
End: 2025-06-20
Payer: COMMERCIAL

## 2025-06-20 DIAGNOSIS — F41.1 GENERALIZED ANXIETY DISORDER: Primary | ICD-10-CM

## 2025-06-20 NOTE — PROGRESS NOTES
Mode of Visit: Video  Location of patient: -HOME-  Location of provider: +HOME+  You have chosen to receive care through a telehealth visit.  The patient has signed the video visit consent form.  The visit included audio and video interaction. No technical issues occurred during this visit.    Date: 2025  Time In: 10:00 AM  Time out: 10:20 AM    This provider is located at [facility], [address] [place] [address], [facility], using a secure [employer] Video Visit through [employer]. Patient is being seen remotely via telehealth at their home address is located in [place]. Patient stated they are in a secure environment for this session. The patient's condition being diagnosed and treated is appropriate for telemedicine. The provider identified themself as well as their credentials. The patient, or  patient's legal guardian consent to be seen remotely, and when consent is given they understand that the consent allows for patient identifiable information to be sent to a third party as needed. They may refuse to be seen remotely at any time. The electronic data is encrypted and password protected, and the patient's or  legal guardian has been advised of the potential risks to privacy not withstanding such measures.   PT Identifiers used: Name and .    You have chosen to receive care through a telehealth visit.  Do you consent to use a video/audio connection for your medical care today? Yes    Subjective   Samina Maya is a 45 y.o. female who presents today for follow up    Chief Complaint:   Chief Complaint   Patient presents with    Anxiety        History of Present Illness:   Anxiety  Visit:  Follow-up  Follow-up visit:     Medication compliance:  %    Symptoms: decreased concentration, excessive worry, irritability, muscle tension and restlessness      Frequency:  Most days    Severity:  Moderate    Sleep quality:  Fair    Nighttime awakenings:  Seldom    Treatments tried:   SSRIs, non-benzodiazephine anxiolytics and lifestyle changes    Improvement on treatment:  Significant         Clinical Maneuvering/Intervention:      Assisted patient in processing above session content; acknowledged and normalized patient’s thoughts, feelings, and concerns.  Rationalized patient thought process regarding concerns presented at session.  Discussed triggers associated with patient's  anxiety  Also discussed coping skills for patient to implement such as increasing activity , self care , positive self talk , and cognitive behavioral strategies.     Allowed patient to freely discuss issues without interruption or judgment. Provided safe, confidential environment to facilitate the development of positive therapeutic relationship and encourage open, honest communication. Assisted patient in identifying risk factors which would indicate the need for higher level of care including thoughts to harm self or others and/or self-harming behavior and encouraged patient to contact this office, call 911, or present to the nearest emergency room should any of these events occur. Discussed crisis intervention services and means to access. Patient adamantly and convincingly denies current suicidal or homicidal ideation or perceptual disturbance.    Assessment: Patient reported no active suicidal ideations, self-injurious behaviors, or homicidal ideations.  Patient reported average sleeping patterns.  Patient reported improvement in appetite.  Therapist continued to use Rogerian focused interventions with patient to build rapport and to provide unconditional positive regard.  Patient and therapist continue to explore triggers for anxiety with patient identifying recent passing of her mother as primary trigger.  Patient and therapist worked to process this trigger while discussing appropriate coping strategies including continued use of daily self-care, increased involvement in enjoyed activities, positive daily self  affirmations, and cognitive behavioral strategies to assist her in identifying and refuting negative thought processes.    Patient appears to maintain relative stability as compared to their baseline.  However, patient continues to struggle with   Chief Complaint   Patient presents with    Anxiety    which continues to cause impairment in important areas of functioning.  A result, they can be reasonably expected to continue to benefit from treatment and would likely be at increased risk for decompensation otherwise.    PHQ-9 Depression Screening  Little interest or pleasure in doing things? Not at all   Feeling down, depressed, or hopeless? Not at all   PHQ-2 Total Score 0   Trouble falling or staying asleep, or sleeping too much? Not at all   Feeling tired or having little energy? Not at all   Poor appetite or overeating? Several days   Feeling bad about yourself - or that you are a failure or have let yourself or your family down? Not at all   Trouble concentrating on things, such as reading the newspaper or watching television? Nearly every day   Moving or speaking so slowly that other people could have noticed? Or the opposite - being so fidgety or restless that you have been moving around a lot more than usual? Not at all     Thoughts that you would be better off dead, or of hurting yourself in some way? Not at all   PHQ-9 Total Score 4   If you checked off any problems, how difficult have these problems made it for you to do your work, take care of things at home, or get along with other people? Somewhat difficult           CONSTANZA-7 Score Total:  Over the last two weeks, how often have you been bothered by the following problems?  Feeling nervous, anxious or on edge: Not at all  Not being able to stop or control worrying: Nearly every day  Worrying too much about different things: Nearly every day  Trouble Relaxing: Nearly every day  Being so restless that it is hard to sit still: Nearly every day  Becoming  easily annoyed or irritable: Several days  Feeling afraid as if something awful might happen: Nearly every day  CONSTANZA 7 Total Score: 16  If you checked any problems, how difficult have these problems made it for you to do your work, take care of things at home, or get along with other people: Very difficult      MENTAL STATUS EXAM   General Appearance:  Cleanly groomed and dressed and well developed  Eye Contact:  Good eye contact  Attitude:  Cooperative and polite  Motor Activity:  Normal gait, posture  Muscle Strength:  Normal  Speech:  Normal rate, tone, volume  Language:  Stereotypical  Mood and affect:  Normal, pleasant, appropriate, mood congruent and euthymic  Hopelessness:  Denies  Loneliness: Denies  Thought Process:  Logical and goal-directed  Associations/ Thought Content:  No delusions  Hallucinations:  None  Suicidal Ideations:  Not present  Homicidal Ideation:  Not present  Sensorium:  Alert and clear  Orientation:  Person, place, time and situation  Immediate Recall, Recent, and Remote Memory:  Intact  Attention Span/ Concentration:  Good  Fund of Knowledge:  Appropriate for age and educational level  Intellectual Functioning:  Average range  Insight:  Good  Judgement:  Good  Reliability:  Good  Impulse Control:  Good         Patient's Support Network Includes:  extended family    Progress toward goal: Not at goal    Prognosis: Good with Ongoing Treatment     Plan: Patient will continue ongoing therapeutic mental health services to assist with stabilization and overall improved quality of life through the use of coping skills and psychoeducation.       Patient will continue in individual outpatient therapy with focus on improved functioning and coping skills, maintaining stability, and avoiding decompensation and the need for higher level of care.    Patient will adhere to medication regimen as prescribed and report any side effects. Patient will contact this office, call 911 or present to the nearest  emergency room should suicidal or homicidal ideations occur. Provide Cognitive Behavioral Therapy and Solution Focused Therapy to improve functioning, maintain stability, and avoid decompensation and the need for higher level of care.     Return in about 5 weeks (around 7/25/2025) for Video visit, Next scheduled follow up.      VISIT DIAGNOSIS:    Diagnosis Plan   1. Generalized anxiety disorder                      This document has been electronically signed by SHIVAM Meneses  June 20, 2025      Part of this note may be an electronic transcription/translation of spoken language to printed text using the Dragon Dictation System.

## 2025-06-24 ENCOUNTER — OFFICE VISIT (OUTPATIENT)
Dept: NEUROLOGY | Facility: CLINIC | Age: 45
End: 2025-06-24
Payer: COMMERCIAL

## 2025-06-24 VITALS
OXYGEN SATURATION: 97 % | SYSTOLIC BLOOD PRESSURE: 110 MMHG | HEART RATE: 79 BPM | BODY MASS INDEX: 17.99 KG/M2 | WEIGHT: 108 LBS | DIASTOLIC BLOOD PRESSURE: 78 MMHG | HEIGHT: 65 IN

## 2025-06-24 DIAGNOSIS — G43.009 MIGRAINE WITHOUT AURA AND WITHOUT STATUS MIGRAINOSUS, NOT INTRACTABLE: Primary | ICD-10-CM

## 2025-06-24 PROCEDURE — 3074F SYST BP LT 130 MM HG: CPT | Performed by: PSYCHIATRY & NEUROLOGY

## 2025-06-24 PROCEDURE — 3078F DIAST BP <80 MM HG: CPT | Performed by: PSYCHIATRY & NEUROLOGY

## 2025-06-24 PROCEDURE — 99204 OFFICE O/P NEW MOD 45 MIN: CPT | Performed by: PSYCHIATRY & NEUROLOGY

## 2025-06-24 PROCEDURE — 1160F RVW MEDS BY RX/DR IN RCRD: CPT | Performed by: PSYCHIATRY & NEUROLOGY

## 2025-06-24 PROCEDURE — 1159F MED LIST DOCD IN RCRD: CPT | Performed by: PSYCHIATRY & NEUROLOGY

## 2025-06-24 RX ORDER — ONDANSETRON 4 MG/1
TABLET, ORALLY DISINTEGRATING ORAL
COMMUNITY
Start: 2025-04-04 | End: 2025-06-24

## 2025-06-24 RX ORDER — SULFAMETHOXAZOLE AND TRIMETHOPRIM 800; 160 MG/1; MG/1
TABLET ORAL
COMMUNITY
Start: 2025-06-10

## 2025-06-24 RX ORDER — OXYCODONE AND ACETAMINOPHEN 7.5; 325 MG/1; MG/1
TABLET ORAL
COMMUNITY
Start: 2025-04-25 | End: 2025-06-24

## 2025-06-24 RX ORDER — NYSTATIN 100000 [USP'U]/ML
SUSPENSION ORAL
COMMUNITY
End: 2025-06-24

## 2025-06-24 RX ORDER — PREDNISONE 20 MG/1
TABLET ORAL
COMMUNITY
End: 2025-06-24

## 2025-06-24 RX ORDER — OXYCODONE HYDROCHLORIDE AND ACETAMINOPHEN 5; 325 MG/1; MG/1
TABLET ORAL
COMMUNITY
Start: 2025-04-17 | End: 2025-06-24

## 2025-06-24 RX ORDER — SUCRALFATE 1 G/1
TABLET ORAL
COMMUNITY
Start: 2025-04-04

## 2025-06-24 RX ORDER — TOPIRAMATE 25 MG/1
TABLET, FILM COATED ORAL
Qty: 42 TABLET | Refills: 0 | Status: SHIPPED | OUTPATIENT
Start: 2025-06-24

## 2025-06-24 RX ORDER — TOPIRAMATE 50 MG/1
50 TABLET, FILM COATED ORAL 2 TIMES DAILY
Qty: 60 TABLET | Refills: 5 | Status: SHIPPED | OUTPATIENT
Start: 2025-06-24

## 2025-06-24 RX ORDER — BUSPIRONE HYDROCHLORIDE 30 MG/1
30 TABLET ORAL
COMMUNITY
Start: 2025-03-27 | End: 2025-06-24
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
## 2025-06-30 ENCOUNTER — TELEMEDICINE (OUTPATIENT)
Dept: PSYCHIATRY | Facility: CLINIC | Age: 45
End: 2025-06-30
Payer: COMMERCIAL

## 2025-06-30 DIAGNOSIS — F33.1 MODERATE EPISODE OF RECURRENT MAJOR DEPRESSIVE DISORDER: Primary | Chronic | ICD-10-CM

## 2025-06-30 DIAGNOSIS — F51.05 INSOMNIA DUE TO OTHER MENTAL DISORDER: Chronic | ICD-10-CM

## 2025-06-30 DIAGNOSIS — F41.1 GENERALIZED ANXIETY DISORDER: Chronic | ICD-10-CM

## 2025-06-30 DIAGNOSIS — F99 INSOMNIA DUE TO OTHER MENTAL DISORDER: Chronic | ICD-10-CM

## 2025-06-30 RX ORDER — MIRTAZAPINE 15 MG/1
15 TABLET, FILM COATED ORAL NIGHTLY
Qty: 30 TABLET | Refills: 0 | Status: SHIPPED | OUTPATIENT
Start: 2025-06-30

## 2025-06-30 RX ORDER — HYDROXYZINE HYDROCHLORIDE 50 MG/1
50 TABLET, FILM COATED ORAL 2 TIMES DAILY PRN
Qty: 60 TABLET | Refills: 0 | Status: SHIPPED | OUTPATIENT
Start: 2025-06-30

## 2025-06-30 RX ORDER — FLUOXETINE HYDROCHLORIDE 40 MG/1
80 CAPSULE ORAL DAILY
Qty: 60 CAPSULE | Refills: 0 | Status: SHIPPED | OUTPATIENT
Start: 2025-06-30

## 2025-06-30 NOTE — PROGRESS NOTES
This provider is located at home office working remotely through the [facility] (through [facility]), [address], [place], [address] using a secure [employer] Video Visit through [employer]. Patient is being seen remotely via telehealth at their home address in [place], and stated they are in a secure environment for this session. The patient's condition being diagnosed/treated is appropriate for telemedicine. The provider identified herself as well as her credentials. The patient, and/or patients guardian, consent to be seen remotely, and when consent is given they understand that the consent allows for patient identifiable information to be sent to a third party as needed. They may refuse to be seen remotely at any time. The electronic data is encrypted and password protected, and the patient and/or guardian has been advised of the potential risks to privacy not withstanding such measures.    You have chosen to receive care through a telehealth visit.  Do you consent to use a video/audio connection for your medical care today? Yes     Patient identifiers utilized: Name and date of birth.    Patient verbally confirmed consent for today's encounter 06/30/2025.    The patient does verbally confirm they are being seen today while physically located in the [facility].  This provider/this APRN is licensed in the [facility] where the patient is located/being seen.         Subjective   Samina Maya is a 45 y.o. female who is here today for medication management follow up.    Chief Complaint: Depression, anxiety, insomnia     Accompanied by: Self - no one else besides the patient present at today's encounter      History of Present Illness:  -Last visit with this APRN: 06/03/2025  -Since last encounter with this APRN/Office: The patient reports that overall she has been feeling better recently.  She states things have been going okay for her recently and that  "symptoms have been more manageable and well-controlled.  She states that overall she is pleased with management of symptoms at this time and endorses she thinks her current medication regimen has been effective for her.  -Mood reported as: \"better\"  -Patient rates symptoms of depression at a 7/10 on a 0-10 scale, with 10 being the worst.   -Patient rates symptoms of anxiety at a 7/10 on a 0-10 scale, with 10 being the worst.    -Appetite reported as: \"better\"  -Sleep reported as: \"good\".  Reports she has been sleeping well with the Remeron.  Denies any nightmares or bad dreams.      -Changes in medications or new medical problems/concerns since last visit: Denies  -Reported medication compliance: The patient reports compliance with their current psychotropic medication regimen.    -Reported medication side effects or concerns: Denies    -Auditory or visual hallucinations: Denies  -Behaviors different from patient baseline, or any reckless, impulsive, or risky behaviors: Denies  -Symptoms of sherron or psychosis: Denies  -Self-injurious behavior: Denies  -SI/HI: The patient adamantly denies any suicidal or homicidal ideations, plans, or intent at the time of this encounter and is convincing.  -No abnormal muscle movements or tics noted by this APRN during today's encounter, and the patient denies any of these symptoms.      -Using a shared decision-making approach the patient reports they would like to continue their current treatment/medication regimen without any adjustments/changes at this time.  When discussing medication efficacy with the patient, and reassessing the need and appropriateness of continued psychotropic medication treatment and doses, they report they are pleased with management of symptoms at this time, and that their current treatment/medication regimen has continued to be effective for them and they do not want to make any changes or adjustments at today's encounter.    -The patient does verbally " "contract for safety at today's encounter and is in verbal agreement with the safety/crisis plan. The patient reports in their own words that they will reach out to this APRN/office prior to next scheduled appointment if there is any worsening of mood, any new psychiatric symptoms, any medication side effects or concerns, any concern for safety to self or others, any suicidal or homicidal ideations plans or intent, or any concerns, or they will call 911, call or text the suicide and crisis lifeline at [phone], or go to the closest emergency department.                    Prior Psychiatric Medications:  Klonopin - reports very effective for her.  Reports that she felt this medication gave her motivation and \"a boost\" and helped her to want to get up and do things.  Reports that this medication was discontinued in 2016 after she had to transfer care due to previous psychiatrist moving out of state and PCP being unwilling to continue both her pain medication/opiates and Klonopin concurrently.    Valium - reports ineffective  Xanax - reports \"made her mean\"      The following portions of the patient's history were reviewed and updated as appropriate: allergies, current medications, past family history, past medical history, past social history, past surgical history, and problem list.    Review of Systems   Constitutional:  Negative for activity change, appetite change, fatigue and unexpected weight change.   Psychiatric/Behavioral:  Positive for dysphoric mood. Negative for agitation, behavioral problems, confusion, decreased concentration, hallucinations, self-injury, sleep disturbance and suicidal ideas. The patient is nervous/anxious. The patient is not hyperactive.        Objective   Physical Exam  Constitutional:       Appearance: Normal appearance.   Neurological:      Mental Status: She is alert.   Psychiatric:         Attention and Perception: Attention and perception normal.         Mood and Affect: Affect normal. " Mood is anxious and depressed.         Speech: Speech normal.         Behavior: Behavior normal. Behavior is cooperative.         Thought Content: Thought content normal. Thought content does not include homicidal or suicidal ideation. Thought content does not include homicidal or suicidal plan.         Cognition and Memory: Cognition and memory normal.         Judgment: Judgment normal.       not currently breastfeeding.    The patient was seen remotely today via a MyChart Video Visit through [facility].  Unable to obtain vital signs due to nature of remote visit.  Height stated at 65 inches.  Weight stated at 108 pounds.         Allergies   Allergen Reactions    Lisinopril Swelling    Morphine Nausea And Vomiting     Only pill form    Doxycycline Nausea And Vomiting    Aspirin Nausea And Vomiting    Corticosteroids Unknown - High Severity    Duloxetine Unknown - Low Severity     Skin high sensitive; felt like skin was crawling    Hydrocodone Nausea And Vomiting     HA    Methadone Nausea And Vomiting    Pregabalin Swelling       No results found for this or any previous visit (from the past 12 weeks).      Current Medications:   Current Outpatient Medications   Medication Sig Dispense Refill    albuterol sulfate  (90 Base) MCG/ACT inhaler Inhale 2 puffs Every 4 (Four) Hours As Needed for Wheezing. 18 g 11    aspirin 81 MG EC tablet Take 1 tablet by mouth Daily. 90 tablet 3    atorvastatin (LIPITOR) 40 MG tablet Take 1 tablet by mouth Every Night. 90 tablet 2    FLUoxetine (PROzac) 40 MG capsule Take 2 capsules by mouth Daily. 60 capsule 0    hydrOXYzine (ATARAX) 50 MG tablet Take 1 tablet by mouth 2 (Two) Times a Day As Needed for Anxiety. 60 tablet 0    metoprolol tartrate (LOPRESSOR) 25 MG tablet Take 1 tablet by mouth 2 (Two) Times a Day. 180 tablet 2    mirtazapine (REMERON) 15 MG tablet Take 1 tablet by mouth Every Night. 30 tablet 0    pantoprazole (PROTONIX) 20 MG EC tablet Take 1 tablet by mouth Daily. 90  tablet 3    sucralfate (CARAFATE) 1 g tablet       sulfamethoxazole-trimethoprim (BACTRIM DS,SEPTRA DS) 800-160 MG per tablet Take  by mouth.      topiramate (Topamax) 25 MG tablet Take 1 tablet at night for 1 week then one tablet twice a day for 1 week then 1 tablet in the morning and 2 tablets at night for 1 week 42 tablet 0    topiramate (Topamax) 50 MG tablet Take 1 tablet by mouth 2 (Two) Times a Day. 60 tablet 5    valsartan (DIOVAN) 320 MG tablet Take 1 tablet by mouth Daily. 90 tablet 3     No current facility-administered medications for this visit.       Mental Status Exam:   Hygiene:   good  Cooperation:  Cooperative  Eye Contact:  Good  Psychomotor Behavior:  Appropriate  Affect:  Full range  Hopelessness: Denies  Speech:  Normal  Thought Process:  Goal directed and Linear  Thought Content:  Normal  Suicidal:  None  Homicidal:  None  Hallucinations:  None  Delusion:  None  Memory:  Intact  Orientation:  Person, Place, Time, and Situation  Reliability:  good  Insight:  Good  Judgement:  Good  Impulse Control:  Good  Physical/Medical Issues:  Yes See medical history        PHQ-9 Depression Screening  Little interest or pleasure in doing things?     Feeling down, depressed, or hopeless?     PHQ-2 Total Score     Trouble falling or staying asleep, or sleeping too much?     Feeling tired or having little energy?     Poor appetite or overeating?     Feeling bad about yourself - or that you are a failure or have let yourself or your family down?     Trouble concentrating on things, such as reading the newspaper or watching television?     Moving or speaking so slowly that other people could have noticed? Or the opposite - being so fidgety or restless that you have been moving around a lot more than usual?     Thoughts that you would be better off dead, or of hurting yourself in some way?     PHQ-9 Total Score     If you checked off any problems, how difficult have these problems made it for you to do your work,  take care of things at home, or get along with other people?           CONSTANZA-7         The patient declined/did not complete the virtual PHQ-9 and CONSTANZA-7 for today's encounter.          Assessment & Plan   Diagnoses and all orders for this visit:    1. Moderate episode of recurrent major depressive disorder (Primary)  -     FLUoxetine (PROzac) 40 MG capsule; Take 2 capsules by mouth Daily.  Dispense: 60 capsule; Refill: 0  -     mirtazapine (REMERON) 15 MG tablet; Take 1 tablet by mouth Every Night.  Dispense: 30 tablet; Refill: 0    2. Generalized anxiety disorder  -     FLUoxetine (PROzac) 40 MG capsule; Take 2 capsules by mouth Daily.  Dispense: 60 capsule; Refill: 0  -     hydrOXYzine (ATARAX) 50 MG tablet; Take 1 tablet by mouth 2 (Two) Times a Day As Needed for Anxiety.  Dispense: 60 tablet; Refill: 0  -     mirtazapine (REMERON) 15 MG tablet; Take 1 tablet by mouth Every Night.  Dispense: 30 tablet; Refill: 0    3. Insomnia due to other mental disorder  -     mirtazapine (REMERON) 15 MG tablet; Take 1 tablet by mouth Every Night.  Dispense: 30 tablet; Refill: 0                Visit Diagnoses:    ICD-10-CM ICD-9-CM   1. Moderate episode of recurrent major depressive disorder  F33.1 296.32   2. Generalized anxiety disorder  F41.1 300.02   3. Insomnia due to other mental disorder  F51.05 300.9    F99 327.02           GOALS:  Short Term Goals: Patient will be compliant with medication, and patient will have no significant medication related side effects.  Patient will be engaged in psychotherapy as indicated.  Patient will report subjective improvement of symptoms.  Long term goals: To stabilize mood and treat/improve subjective symptoms, the patient will stay out of the hospital, the patient will be at an optimal level of functioning, and the patient will take all medications as prescribed.  The patient verbalized understanding and agreement with goals that were mutually set.          TREATMENT PLAN/GOALS: Continue  medications and treatment plan as indicated. Treatment and medication options discussed during today's visit. Patient acknowledged and verbally consented to continue with current treatment plan and was educated on the importance of compliance with treatment and follow-up appointments.        - Continue Prozac 80 mg daily for depression/anxiety  - Continue Remeron 15 mg nightly for insomnia and as adjunct for depression/anxiety  - Continue Hydroxyzine 50 mg twice daily as needed for anxiety        MEDICATION ISSUES: Discussed treatment plan and medication options of prescribed medication, including any off label use of medication, as well as the risks, benefits, black box warnings, and side effects including sedation or drowsiness and potential falls, possible impaired driving, worsened or change in mood, suicidal and or homicidal ideations, changes in weight, and metabolic adversities among others. Patient is agreeable to call the office with any worsening of symptoms or onset of side effects, or if any concerns or questions arise.  The contact information for the office is available to the patient, telephone number [phone]. Patient is agreeable to call 911 or go to the nearest emergency department should they begin having any suicidal or homicidal ideations, plans, or intent, or if any urgent concerns arise. No medication side effects or related complaints today.      Important educational information made available and provided in after visit summary for patient to review.     Due to the nature of virtual visits and inability to monitor vital signs and weight with virtual visits, the patient has been encouraged to monitor their vital signs and weight regularly either through self-monitoring via home device(s) or with their Primary Care Provider, and the patient has been instructed to notify this APRN of any abnormalities or changes from baseline.     Patient aware of limitations of provider's availability and  office hours, and how to reach provider/office if needed (office number for patient to call for any questions/concerns: [phone]). If the patient's needs require more frequent or intensive management/monitoring than can be provided from this provider utilizing a strictly virtual platform, or care that is outside of this provider's scope, then patient may be referred to more appropriate provider or modality.                       VERBAL INFORMED CONSENT FOR MEDICATION:  The patient was educated that their proposed/prescribed psychotropic medication(s) has potential risks, side effects, adverse effects, and black box warnings; and these have been discussed with the patient.  The patient has been informed that their treatment and medication dosage is to be individualized, and may even be above or below the recommended range/dosage due to patient individualization and response, but medication is prescribed using a shared decision making approach, and no medication or dosage will be prescribed without the patient's verbal consent.  The reason for the use of the medication including any off label use and alternative modes of treatment other than or in addition to medication has been considered and discussed, the probable consequences of not receiving the proposed treatment have been discussed, and any treatment side effects, black box warnings, and cautions associated with treatment have been discussed with the patient.  The patient is allowed ample time to openly discuss and ask questions regarding the proposed medication(s) and treatment plan and the patient verbalizes understanding the reasons for the use of the medication, its potential risks and benefits, other alternative treatment(s), and the probable consequences that may occur if the proposed medication is not given.  The patient has been given ample time to ask questions and study the information and find the information to be specific, accurate, and  complete.  The patient gives verbal consent for the medication(s) proposed/prescribed, they verbalized understanding that they can refuse and withdraw consent at any time with the assistance of this APRN, and the patient has verbally confirmed that they are aware, and are willing, to take the prescribed medication and follow the treatment plan with the known possible risks, side effect, black box warnings, and any potential medication interactions, and the patient reports they will be worse off without this medication and treatment plan.  The patient is advised to contact this APRN/this office if any questions or concerns arise at any time (at [phone]), or call 911/go to the closest emergency department if needed or outside of office hours.        SUICIDE RISK ASSESSMENT AND SAFETY PLAN: Unalterable demographics and a history of mental health intervention indicate this patient is in a high risk category compared to the general population. At present, the patient denies active SI/HI, intentions, or plans at this time and agrees to seek immediate care should such thoughts develop. The patient verbalizes understanding of how to access emergency care if needed and agrees to do so. Consideration of suicide risk and protective factors such as history, current presentation, individual strengths and weaknesses, psychosocial and environmental stressors and variables, psychiatric illness and symptoms, medical conditions and pain, took place in this interview. Based on those considerations, the patient is determined: within individual baseline and presenting no imminent risk for suicide or homicide. Other recommendations: The patient does not meet the criteria for inpatient admission and is not a safety risk to self or others at today's visit. Inpatient treatment offers no significant advantages over outpatient treatment for this patient at today's visit.  The patient was given ample time for questions and fully  participated in treatment planning.  The patient was encouraged to call the clinic with any questions or concerns.  The patient was informed of access to emergency care. If patient were to develop any significant symptomatology, suicidal ideation, homicidal ideation, any concerns, or feel unsafe at any time they are to call the clinic and if unable to get immediate assistance should immediately call 911 or go to the nearest emergency room.  Patient contracted verbally for the following: If you are experiencing an emotional crisis or have thoughts of harming yourself or others, please go to your nearest local emergency room or call 911. Will continue to re-assess medication response and side effects frequently to establish efficacy and ensure safety. Risks, any black box warnings, side effects, off label usage, and benefits of medication and treatment discussed with patient, along with potential adverse side effects of current and/or newly prescribed medication, alternative treatment options, and OTC medications.  Patient verbalized understanding of potential risks, any off label use of medication, any black box warnings, and any side effects in their own words. The patient verbalized understanding and agreed to comply with the safety plan discussed in their own words.  Patient given the number to the office. Number also discussed of the 24- hour suicide hotline.           MEDS ORDERED DURING VISIT:  New Medications Ordered This Visit   Medications    FLUoxetine (PROzac) 40 MG capsule     Sig: Take 2 capsules by mouth Daily.     Dispense:  60 capsule     Refill:  0    hydrOXYzine (ATARAX) 50 MG tablet     Sig: Take 1 tablet by mouth 2 (Two) Times a Day As Needed for Anxiety.     Dispense:  60 tablet     Refill:  0    mirtazapine (REMERON) 15 MG tablet     Sig: Take 1 tablet by mouth Every Night.     Dispense:  30 tablet     Refill:  0       Future Appointments         Provider Department [place]    7/9/2025 9:30 AM  (Arrive by 9:15 AM) Priyank Day MD [facility]    7/29/2025 3:00 PM Christine Oneil APRN [facility]    7/31/2025 8:00 AM Elisabeth Nava [facility]    8/14/2025 8:00 AM Elisabeth Nava [facility]    8/28/2025 8:00 AM Elisabeth Nava [facility]    9/5/2025 11:15 AM (Arrive by 11:00 AM) Sudhir Gan APRN [facility] PR[facility]    9/24/2025 2:30 PM (Arrive by 2:15 PM) Sharlene Dawn MD [facility] NEUROLOG[facility]              Return in about 4 weeks (around 7/28/2025), or if symptoms worsen or fail to improve, for Recheck.    The patient will follow-up in approximately 4 weeks, and follow-up appointment has been scheduled with the patient confirming agreeability/availability for date/time during today's encounter.  Instructed the patient to contact the [facility] if they have any questions or concerns, and that a sooner appointment will be provided if needed.  The patient verbalized understanding in their own words and endorsed that they are agreeable to this.             Functional Status: Moderate impairment     Prognosis: Good with Ongoing Treatment             This document has been electronically signed by MERYL Sims  June 30, 2025 14:36 EDT      Some of the data in this electronic note has been brought forward from a previous encounter, any necessary changes have been made, it has been reviewed by this APRN, and it is accurate.       Part of this note may be an electronic transcription/translation of spoken language to printed text using the Dragon Dictation System.

## 2025-07-09 ENCOUNTER — OFFICE VISIT (OUTPATIENT)
Dept: CARDIOLOGY | Facility: CLINIC | Age: 45
End: 2025-07-09
Payer: COMMERCIAL

## 2025-07-09 VITALS
RESPIRATION RATE: 18 BRPM | HEART RATE: 84 BPM | HEIGHT: 65 IN | WEIGHT: 131 LBS | BODY MASS INDEX: 21.83 KG/M2 | SYSTOLIC BLOOD PRESSURE: 168 MMHG | OXYGEN SATURATION: 99 % | DIASTOLIC BLOOD PRESSURE: 84 MMHG

## 2025-07-09 DIAGNOSIS — E78.2 MIXED HYPERLIPIDEMIA: ICD-10-CM

## 2025-07-09 DIAGNOSIS — I25.10 CORONARY ARTERY DISEASE INVOLVING NATIVE CORONARY ARTERY OF NATIVE HEART WITHOUT ANGINA PECTORIS: Primary | ICD-10-CM

## 2025-07-09 DIAGNOSIS — I10 ESSENTIAL HYPERTENSION: ICD-10-CM

## 2025-07-09 PROBLEM — Z72.0 TOBACCO ABUSE: Status: RESOLVED | Noted: 2024-03-12 | Resolved: 2025-07-09

## 2025-07-09 PROCEDURE — 1159F MED LIST DOCD IN RCRD: CPT | Performed by: INTERNAL MEDICINE

## 2025-07-09 PROCEDURE — 3077F SYST BP >= 140 MM HG: CPT | Performed by: INTERNAL MEDICINE

## 2025-07-09 PROCEDURE — 1160F RVW MEDS BY RX/DR IN RCRD: CPT | Performed by: INTERNAL MEDICINE

## 2025-07-09 PROCEDURE — 3079F DIAST BP 80-89 MM HG: CPT | Performed by: INTERNAL MEDICINE

## 2025-07-09 RX ORDER — AMLODIPINE BESYLATE 5 MG/1
5 TABLET ORAL DAILY
Qty: 90 TABLET | Refills: 3 | Status: SHIPPED | OUTPATIENT
Start: 2025-07-09

## 2025-07-09 NOTE — PROGRESS NOTES
[facility] [place]  [facility] CARDIOLOGY  [address] [place] [address]  Dept: [phone]  Dept Fax: [phone]    Samina Maya  1980    Follow Up Office Visit Note    History of Present Illness:  Samina Maya is a 45 y.o. female who presents to the clinic for Follow-up.CAD- She denies any chest pain, on ASA has prior stents to RCA and also has 50% LAD- she has quit smoking, no CP, no complaints EKG sinus HR 48 BP is 160.70 on Metoprolol 25 bid and also Valsartan 320 mg, will add Amlodipine 5mg    The following portions of the patient's history were reviewed and updated as appropriate: allergies, current medications, past family history, past medical history, past social history, past surgical history, and problem list.    Medications:  albuterol sulfate HFA  amLODIPine  aspirin  atorvastatin  FLUoxetine  hydrOXYzine  metoprolol tartrate  mirtazapine  pantoprazole  sucralfate  topiramate  valsartan    Subjective  Allergies   Allergen Reactions   • Lisinopril Swelling   • Morphine Nausea And Vomiting     Only pill form   • Doxycycline Nausea And Vomiting   • Aspirin Nausea And Vomiting   • Corticosteroids Unknown - High Severity   • Duloxetine Unknown - Low Severity     Skin high sensitive; felt like skin was crawling   • Hydrocodone Nausea And Vomiting     HA   • Methadone Nausea And Vomiting   • Pregabalin Swelling        Past Medical History:   Diagnosis Date   • ADHD (attention deficit hyperactivity disorder) 2006   • Allergic    • Anxiety 2006   • Arthritis of back 2013   • Asthma 2018   • CHF (congestive heart failure) 2018   • Cholelithiasis     Removed 2013   • Coronary artery disease 2018   • CTS (carpal tunnel syndrome) 2018   • Depression 2006   • Dislocated elbow 2018   • Fracture of ankle 2023   • Fracture, foot 2009 & 2023    Right then left   • GERD (gastroesophageal reflux disease)    • Hip arthrosis 2013   • Hyperlipidemia 2018   • Hypertension 2018    • Knee swelling 2022   • Low back pain 2009   • Low back strain 2009   • Lumbosacral disc disease 2009   • Myocardial infarction 2018   • Osteopenia 2016   • PTSD (post-traumatic stress disorder)    • Scoliosis 2009   • Tennis elbow 2018   • Urinary tract infection        Past Surgical History:   Procedure Laterality Date   • ABDOMINAL SURGERY     • BACK SURGERY  2009   • CARPAL TUNNEL RELEASE Left 03/20/2024    Dr. Perez   • CHOLECYSTECTOMY  2013   • CORONARY STENT PLACEMENT  2018    2 stents   • CYST REMOVAL     • SPINE SURGERY  2009   • TRIGGER POINT INJECTION  2023   • UPPER GASTROINTESTINAL ENDOSCOPY  2019       Family History   Problem Relation Age of Onset   • Dementia Mother    • Anxiety disorder Mother    • Alcohol abuse Mother    • Cancer Mother    • Arthritis Mother    • Depression Mother    • Hyperlipidemia Mother    • Anxiety disorder Father    • Diabetes Father    • Severe sprains Father    • Arthritis Father    • Depression Father    • Anxiety disorder Maternal Aunt         Strokes and tumor in heart   • Depression Maternal Aunt    • Heart disease Maternal Aunt    • Stroke Maternal Aunt    • Dementia Maternal Grandfather    • Arthritis Maternal Grandfather    • COPD Maternal Grandfather    • Hyperlipidemia Maternal Grandfather    • Arthritis Maternal Grandmother    • Heart disease Maternal Grandmother    • Stroke Maternal Grandmother    • Colon polyps Maternal Grandmother    • Cancer Paternal Grandmother    • Colon cancer Paternal Grandmother    • Other Paternal Grandmother    • ADD / ADHD Son    • Anxiety disorder Son         Adhd, Bipolar        Social History     Socioeconomic History   • Marital status: Single   • Number of children: 1   Tobacco Use   • Smoking status: Former     Current packs/day: 0.25     Average packs/day: 0.3 packs/day for 29.1 years (7.3 ttl pk-yrs)     Types: Cigarettes     Start date: 5/19/1996     Passive exposure: Past   • Smokeless tobacco: Never   • Tobacco  "comments:     Patient reported she is smoking around 3 cigarettes per day and is attempting to cut down to zero completely.    Vaping Use   • Vaping status: Every Day   • Substances: Nicotine   • Devices: Disposable   • Passive vaping exposure: Yes   Substance and Sexual Activity   • Alcohol use: Never   • Drug use: Yes     Types: Marijuana   • Sexual activity: Yes     Partners: Male     Birth control/protection: None, Depo-provera       Review of Systems   Constitutional: Negative.    HENT: Negative.     Respiratory: Negative.     Cardiovascular: Negative.    Endocrine: Negative.    Genitourinary: Negative.    Musculoskeletal: Negative.    Skin: Negative.    Allergic/Immunologic: Negative.    Neurological: Negative.    Hematological: Negative.    Psychiatric/Behavioral: Negative.       Cardiovascular Procedures    ECHO/MUGA:  STRESS TESTS:   CARDIAC CATH:   DEVICES:   HOLTER:   CT/MRI:   VASCULAR:   CARDIOTHORACIC:     Objective  Vitals:    07/09/25 0933   BP: 168/84   BP Location: Right arm   Patient Position: Sitting   Cuff Size: Adult   Pulse: 84   Resp: 18   SpO2: 99%   Weight: 59.4 kg (131 lb)   Height: 165.1 cm (65\")   PainSc: 0-No pain     Body mass index is 21.8 kg/m².     Physical Exam  Vitals reviewed.   Constitutional:       Appearance: Healthy appearance. Not in distress.   Neck:      Vascular: No JVR. JVD normal.   Pulmonary:      Effort: Pulmonary effort is normal.      Breath sounds: Normal breath sounds. No wheezing. No rhonchi. No rales.   Chest:      Chest wall: Not tender to palpatation.   Cardiovascular:      PMI at left midclavicular line. Normal rate. Regular rhythm. Normal S1. Normal S2.       Murmurs: There is no murmur.      No gallop.  No click. No rub.   Pulses:     Intact distal pulses.   Edema:     Peripheral edema absent.   Abdominal:      General: Bowel sounds are normal.      Palpations: Abdomen is soft.      Tenderness: There is no abdominal tenderness.   Musculoskeletal: Normal " range of motion.         General: No tenderness. Skin:     General: Skin is warm and dry.   Neurological:      General: No focal deficit present.      Mental Status: Alert and oriented to person, place and time.        Diagnostic Data    ECG 12 Lead    Date/Time: 7/9/2025 4:46 PM  Performed by: Priyank Day MD    Authorized by: Priyank Day MD  Comparison: compared with previous ECG from 1/29/2025  Similar to previous ECG  Rhythm: sinus rhythm and sinus bradycardia  Rate: normal  BPM: 48  QRS axis: normal    Clinical impression: normal ECG        Assessment and Plan  Diagnoses and all orders for this visit:    Coronary artery disease involving native coronary artery of native heart without angina pectoris- No chest pain, doing good on ASA    Essential hypertension- BP is elevated 160.,80 on Valsartan 320 mg, Metoprolol 25 bid and also Will add Amlodipine 5mg. Will see her back in 1 month    Mixed hyperlipidemia- On Lipitor 40 mg LDL was 68 on 11/2024    Other orders  -     amLODIPine (NORVASC) 5 MG tablet; Take 1 tablet by mouth Daily.         Return in about 1 month (around 8/9/2025) for Recheck with Dr. Day.    Priyank Day MD  07/09/2025

## 2025-07-14 RX ORDER — TOPIRAMATE 25 MG/1
TABLET, FILM COATED ORAL
Qty: 42 TABLET | Refills: 0 | OUTPATIENT
Start: 2025-07-14

## 2025-07-14 NOTE — TELEPHONE ENCOUNTER
Rx Refill Note  Requested Prescriptions     Pending Prescriptions Disp Refills    topiramate (TOPAMAX) 25 MG tablet [Pharmacy Med Name: TOPIRAMATE 25 MG TABLET] 42 tablet 0     Sig: TAKE 1 TABLET BY MOUTH EVERY EVENING FOR 1 WEEK, THEN TAKE 1 TABLET BY MOUTH 2 TIMES A DAY FOR 1 WEEK, THEN TAKE 1 TABLET BY MOUTH EVERY MORNING AND TAKE 2 TABLETS BY MOUTH IN THE EVENING FOR 1 WEEK      Last filled:  Last office visit with prescribing clinician: 6/24/2025      Next office visit with prescribing clinician: 9/24/2025     Seble Littlejohn MA  07/14/25, 10:50 EDT      The original prescription was discontinued on 7/9/2025 by Priyank Day MD for the following reason: Discontinued by another clinician. Renewing this prescription may not be appropriate

## 2025-07-29 ENCOUNTER — TELEMEDICINE (OUTPATIENT)
Dept: PSYCHIATRY | Facility: CLINIC | Age: 45
End: 2025-07-29
Payer: COMMERCIAL

## 2025-07-29 DIAGNOSIS — F99 INSOMNIA DUE TO OTHER MENTAL DISORDER: Chronic | ICD-10-CM

## 2025-07-29 DIAGNOSIS — F51.05 INSOMNIA DUE TO OTHER MENTAL DISORDER: Chronic | ICD-10-CM

## 2025-07-29 DIAGNOSIS — F33.0 MILD EPISODE OF RECURRENT MAJOR DEPRESSIVE DISORDER: Primary | Chronic | ICD-10-CM

## 2025-07-29 DIAGNOSIS — F41.1 GENERALIZED ANXIETY DISORDER: Chronic | ICD-10-CM

## 2025-07-29 RX ORDER — MIRTAZAPINE 15 MG/1
15 TABLET, FILM COATED ORAL NIGHTLY
Qty: 30 TABLET | Refills: 0 | Status: SHIPPED | OUTPATIENT
Start: 2025-07-29

## 2025-07-29 RX ORDER — FLUOXETINE HYDROCHLORIDE 40 MG/1
80 CAPSULE ORAL DAILY
Qty: 60 CAPSULE | Refills: 0 | Status: SHIPPED | OUTPATIENT
Start: 2025-07-29

## 2025-07-29 RX ORDER — HYDROXYZINE HYDROCHLORIDE 50 MG/1
50 TABLET, FILM COATED ORAL 3 TIMES DAILY PRN
Qty: 90 TABLET | Refills: 0 | Status: SHIPPED | OUTPATIENT
Start: 2025-07-29

## 2025-07-29 NOTE — PROGRESS NOTES
This provider is located at home office working remotely through the [facility] (through [facility]), [address], [facility], [address] using a secure [employer] Video Visit through [employer]. Patient is being seen remotely via telehealth at their home address in [place], and stated they are in a secure environment for this session. The patient's condition being diagnosed/treated is appropriate for telemedicine. The provider identified herself as well as her credentials. The patient, and/or patients guardian, consent to be seen remotely, and when consent is given they understand that the consent allows for patient identifiable information to be sent to a third party as needed. They may refuse to be seen remotely at any time. The electronic data is encrypted and password protected, and the patient and/or guardian has been advised of the potential risks to privacy not withstanding such measures.    You have chosen to receive care through a telehealth visit.  Do you consent to use a video/audio connection for your medical care today? Yes     Patient identifiers utilized: Name and date of birth.    Patient verbally confirmed consent for today's encounter 07/29/2025.    The patient does verbally confirm they are being seen today while physically located in the [facility].  This provider/this APRN is licensed in the [facility] where the patient is located/being seen.         Subjective   Samina Maya is a 45 y.o. female who is here today for medication management follow up.    Chief Complaint: Depression, anxiety, insomnia     Accompanied by: Self - no one else besides the patient present at today's encounter      History of Present Illness:  -Last visit with this APRN: 06/30/2025  -Since last encounter with this APRN/Office: The patient reports that overall things have been going okay for her over the past few weeks.  She states that she has been dealing with  "getting all of her mother's affairs in order now that she has passed away.  She states that she has had a lot to do with dealing with all of this, but states that she feels that she is managing things well given recent circumstances.  She states that overall she thinks depression and anxiety symptoms have continued to be well controlled with her current medication regimen.  -Mood reported as: \"okay\"  -Patient rates symptoms of depression at a 6/10 on a 0-10 scale, with 10 being the worst.   -Patient rates symptoms of anxiety at a 4/10 on a 0-10 scale, with 10 being the worst.    -Appetite reported as: \"good\"  -Sleep reported as: \"really good\".  Reports she has continued to sleep really well with the Remeron.  Denies any nightmares or bad dreams.    -Changes in medications or new medical problems/concerns since last visit: Denies  -Reported medication compliance: The patient endorses that she has been using the hydroxyzine up to 3 times daily as needed for anxiety rather than up to twice daily as needed for anxiety, and endorses that this has been helpful for her and that anxiety symptoms have been improved with additional third dose as needed.  The patient otherwise reports compliance with their current psychotropic medication regimen.    -Reported medication side effects or concerns: Denies    -Auditory or visual hallucinations: Denies  -Behaviors different from patient baseline, or any reckless, impulsive, or risky behaviors: Denies  -Symptoms of sherron or psychosis: Denies  -Self-injurious behavior: Denies  -SI/HI: The patient adamantly denies any suicidal or homicidal ideations, plans, or intent at the time of this encounter and is convincing.  -No abnormal muscle movements or tics noted by this APRN during today's encounter, and the patient denies any of these symptoms.      -Using a shared decision-making approach the patient reports they would like to continue their current treatment/medication regimen without " "any adjustments/changes at this time.  When discussing medication efficacy with the patient, and reassessing the need and appropriateness of continued psychotropic medication treatment and doses, they report they are pleased with management of symptoms at this time, and that their current treatment/medication regimen has continued to be effective for them and they do not want to make any changes or adjustments at today's encounter.    -The patient does verbally contract for safety at today's encounter and is in verbal agreement with the safety/crisis plan. The patient reports in their own words that they will reach out to this APRN/office prior to next scheduled appointment if there is any worsening of mood, any new psychiatric symptoms, any medication side effects or concerns, any concern for safety to self or others, any suicidal or homicidal ideations plans or intent, or any concerns, or they will call 911, call or text the suicide and crisis lifeline at [phone], or go to the closest emergency department.                Prior Psychiatric Medications:  Klonopin - reports very effective for her.  Reports that she felt this medication gave her motivation and \"a boost\" and helped her to want to get up and do things.  Reports that this medication was discontinued in 2016 after she had to transfer care due to previous psychiatrist moving out of state and PCP being unwilling to continue both her pain medication/opiates and Klonopin concurrently.    Valium - reports ineffective  Xanax - reports \"made her mean\"      The following portions of the patient's history were reviewed and updated as appropriate: allergies, current medications, past family history, past medical history, past social history, past surgical history, and problem list.    Review of Systems   Constitutional:  Negative for activity change, appetite change, fatigue and unexpected weight change.   Psychiatric/Behavioral:  Positive for dysphoric mood. Negative " for agitation, behavioral problems, confusion, decreased concentration, hallucinations, self-injury, sleep disturbance and suicidal ideas. The patient is nervous/anxious. The patient is not hyperactive.        Objective   Physical Exam  Constitutional:       Appearance: Normal appearance.   Neurological:      Mental Status: She is alert.   Psychiatric:         Attention and Perception: Attention and perception normal.         Mood and Affect: Affect normal. Mood is anxious and depressed.         Speech: Speech normal.         Behavior: Behavior normal. Behavior is cooperative.         Thought Content: Thought content normal. Thought content does not include homicidal or suicidal ideation. Thought content does not include homicidal or suicidal plan.         Cognition and Memory: Cognition and memory normal.         Judgment: Judgment normal.       not currently breastfeeding.    The patient was seen remotely today via a MyChart Video Visit through [facility].  Unable to obtain vital signs due to nature of remote visit.  Height stated at 65 inches.  Weight stated at 131 pounds.         Allergies   Allergen Reactions    Lisinopril Swelling    Morphine Nausea And Vomiting     Only pill form    Doxycycline Nausea And Vomiting    Aspirin Nausea And Vomiting    Corticosteroids Unknown - High Severity    Duloxetine Unknown - Low Severity     Skin high sensitive; felt like skin was crawling    Hydrocodone Nausea And Vomiting     HA    Methadone Nausea And Vomiting    Pregabalin Swelling       No results found for this or any previous visit (from the past 12 weeks).      Current Medications:   Current Outpatient Medications   Medication Sig Dispense Refill    albuterol sulfate  (90 Base) MCG/ACT inhaler Inhale 2 puffs Every 4 (Four) Hours As Needed for Wheezing. 18 g 11    amLODIPine (NORVASC) 5 MG tablet Take 1 tablet by mouth Daily. 90 tablet 3    aspirin 81 MG EC tablet Take 1 tablet by mouth Daily. 90 tablet 3     atorvastatin (LIPITOR) 40 MG tablet Take 1 tablet by mouth Every Night. 90 tablet 2    FLUoxetine (PROzac) 40 MG capsule Take 2 capsules by mouth Daily. 60 capsule 0    hydrOXYzine (ATARAX) 50 MG tablet Take 1 tablet by mouth 3 (Three) Times a Day As Needed for Anxiety. 90 tablet 0    metoprolol tartrate (LOPRESSOR) 25 MG tablet Take 1 tablet by mouth 2 (Two) Times a Day. 180 tablet 2    mirtazapine (REMERON) 15 MG tablet Take 1 tablet by mouth Every Night. 30 tablet 0    pantoprazole (PROTONIX) 20 MG EC tablet Take 1 tablet by mouth Daily. 90 tablet 3    sucralfate (CARAFATE) 1 g tablet       topiramate (Topamax) 50 MG tablet Take 1 tablet by mouth 2 (Two) Times a Day. 60 tablet 5    valsartan (DIOVAN) 320 MG tablet Take 1 tablet by mouth Daily. 90 tablet 3     No current facility-administered medications for this visit.       Mental Status Exam:   Hygiene:   good  Cooperation:  Cooperative  Eye Contact:  Good  Psychomotor Behavior:  Appropriate  Affect:  Full range  Hopelessness: Denies  Speech:  Normal  Thought Process:  Goal directed and Linear  Thought Content:  Normal  Suicidal:  None  Homicidal:  None  Hallucinations:  None  Delusion:  None  Memory:  Intact  Orientation:  Person, Place, Time, and Situation  Reliability:  good  Insight:  Good  Judgement:  Good  Impulse Control:  Good  Physical/Medical Issues:  Yes See medical history        PHQ-9 Depression Screening  Little interest or pleasure in doing things? (Patient-Rptd) Several days   Feeling down, depressed, or hopeless? (Patient-Rptd) Not at all   PHQ-2 Total Score (Patient-Rptd) 1   Trouble falling or staying asleep, or sleeping too much? (Patient-Rptd) Not at all   Feeling tired or having little energy? (Patient-Rptd) Not at all   Poor appetite or overeating? (Patient-Rptd) Not at all   Feeling bad about yourself - or that you are a failure or have let yourself or your family down? (Patient-Rptd) Not at all   Trouble concentrating on things, such as  reading the newspaper or watching television? (Patient-Rptd) Not at all   Moving or speaking so slowly that other people could have noticed? Or the opposite - being so fidgety or restless that you have been moving around a lot more than usual? (Patient-Rptd) Not at all   Thoughts that you would be better off dead, or of hurting yourself in some way? (Patient-Rptd) Not at all   PHQ-9 Total Score (Patient-Rptd) 1   If you checked off any problems, how difficult have these problems made it for you to do your work, take care of things at home, or get along with other people? (Patient-Rptd) Not difficult at all         CONSTANZA-7  Feeling nervous, anxious or on edge: (Patient-Rptd) Not at all  Not being able to stop or control worrying: (Patient-Rptd) Not at all  Worrying too much about different things: (Patient-Rptd) Not at all  Trouble Relaxing: (Patient-Rptd) Not at all  Being so restless that it is hard to sit still: (Patient-Rptd) Not at all  Feeling afraid as if something awful might happen: (Patient-Rptd) Not at all  Becoming easily annoyed or irritable: (Patient-Rptd) Not at all  CONSTANZA 7 Total Score: (Patient-Rptd) 0  If you checked any problems, how difficult have these problems made it for you to do your work, take care of things at home, or get along with other people: (Patient-Rptd) Not difficult at all             Assessment & Plan   Diagnoses and all orders for this visit:    1. Mild episode of recurrent major depressive disorder (Primary)  -     FLUoxetine (PROzac) 40 MG capsule; Take 2 capsules by mouth Daily.  Dispense: 60 capsule; Refill: 0  -     mirtazapine (REMERON) 15 MG tablet; Take 1 tablet by mouth Every Night.  Dispense: 30 tablet; Refill: 0    2. Generalized anxiety disorder  -     FLUoxetine (PROzac) 40 MG capsule; Take 2 capsules by mouth Daily.  Dispense: 60 capsule; Refill: 0  -     mirtazapine (REMERON) 15 MG tablet; Take 1 tablet by mouth Every Night.  Dispense: 30 tablet; Refill: 0  -      hydrOXYzine (ATARAX) 50 MG tablet; Take 1 tablet by mouth 3 (Three) Times a Day As Needed for Anxiety.  Dispense: 90 tablet; Refill: 0    3. Insomnia due to other mental disorder  -     mirtazapine (REMERON) 15 MG tablet; Take 1 tablet by mouth Every Night.  Dispense: 30 tablet; Refill: 0                  Visit Diagnoses:    ICD-10-CM ICD-9-CM   1. Mild episode of recurrent major depressive disorder  F33.0 296.31   2. Generalized anxiety disorder  F41.1 300.02   3. Insomnia due to other mental disorder  F51.05 300.9    F99 327.02             GOALS:  Short Term Goals: Patient will be compliant with medication, and patient will have no significant medication related side effects.  Patient will be engaged in psychotherapy as indicated.  Patient will report subjective improvement of symptoms.  Long term goals: To stabilize mood and treat/improve subjective symptoms, the patient will stay out of the hospital, the patient will be at an optimal level of functioning, and the patient will take all medications as prescribed.  The patient verbalized understanding and agreement with goals that were mutually set.          TREATMENT PLAN/GOALS: Continue medications and treatment plan as indicated. Treatment and medication options discussed during today's visit. Patient acknowledged and verbally consented to continue with current treatment plan and was educated on the importance of compliance with treatment and follow-up appointments.        - Continue Prozac 80 mg daily for depression/anxiety  - Continue Remeron 15 mg nightly for insomnia and as adjunct for depression/anxiety  - Continue Hydroxyzine 50 mg three times daily as needed for anxiety        MEDICATION ISSUES: Discussed treatment plan and medication options of prescribed medication, including any off label use of medication, as well as the risks, benefits, black box warnings, and side effects including sedation or drowsiness and potential falls, possible impaired  driving, worsened or change in mood, suicidal and or homicidal ideations, changes in weight, and metabolic adversities among others. Patient is agreeable to call the office with any worsening of symptoms or onset of side effects, or if any concerns or questions arise.  The contact information for the office is available to the patient, telephone number [phone]. Patient is agreeable to call 911 or go to the nearest emergency department should they begin having any suicidal or homicidal ideations, plans, or intent, or if any urgent concerns arise. No medication side effects or related complaints today.      Important educational information made available and provided in after visit summary for patient to review.     Due to the nature of virtual visits and inability to monitor vital signs and weight with virtual visits, the patient has been encouraged to monitor their vital signs and weight regularly either through self-monitoring via home device(s) or with their Primary Care Provider, and the patient has been instructed to notify this APRN of any abnormalities or changes from baseline.     Patient aware of limitations of provider's availability and office hours, and how to reach provider/office if needed (office number for patient to call for any questions/concerns: [phone]). If the patient's needs require more frequent or intensive management/monitoring than can be provided from this provider utilizing a strictly virtual platform, or care that is outside of this provider's scope, then patient may be referred to more appropriate provider or modality.                       VERBAL INFORMED CONSENT FOR MEDICATION:  The patient was educated that their proposed/prescribed psychotropic medication(s) has potential risks, side effects, adverse effects, and black box warnings; and these have been discussed with the patient.  The patient has been informed that their treatment and medication dosage is to be  individualized, and may even be above or below the recommended range/dosage due to patient individualization and response, but medication is prescribed using a shared decision making approach, and no medication or dosage will be prescribed without the patient's verbal consent.  The reason for the use of the medication including any off label use and alternative modes of treatment other than or in addition to medication has been considered and discussed, the probable consequences of not receiving the proposed treatment have been discussed, and any treatment side effects, black box warnings, and cautions associated with treatment have been discussed with the patient.  The patient is allowed ample time to openly discuss and ask questions regarding the proposed medication(s) and treatment plan and the patient verbalizes understanding the reasons for the use of the medication, its potential risks and benefits, other alternative treatment(s), and the probable consequences that may occur if the proposed medication is not given.  The patient has been given ample time to ask questions and study the information and find the information to be specific, accurate, and complete.  The patient gives verbal consent for the medication(s) proposed/prescribed, they verbalized understanding that they can refuse and withdraw consent at any time with the assistance of this APRN, and the patient has verbally confirmed that they are aware, and are willing, to take the prescribed medication and follow the treatment plan with the known possible risks, side effect, black box warnings, and any potential medication interactions, and the patient reports they will be worse off without this medication and treatment plan.  The patient is advised to contact this APRN/this office if any questions or concerns arise at any time (at [phone]), or call 911/go to the closest emergency department if needed or outside of office hours.        SUICIDE RISK  ASSESSMENT AND SAFETY PLAN: Unalterable demographics and a history of mental health intervention indicate this patient is in a high risk category compared to the general population. At present, the patient denies active SI/HI, intentions, or plans at this time and agrees to seek immediate care should such thoughts develop. The patient verbalizes understanding of how to access emergency care if needed and agrees to do so. Consideration of suicide risk and protective factors such as history, current presentation, individual strengths and weaknesses, psychosocial and environmental stressors and variables, psychiatric illness and symptoms, medical conditions and pain, took place in this interview. Based on those considerations, the patient is determined: within individual baseline and presenting no imminent risk for suicide or homicide. Other recommendations: The patient does not meet the criteria for inpatient admission and is not a safety risk to self or others at today's visit. Inpatient treatment offers no significant advantages over outpatient treatment for this patient at today's visit.  The patient was given ample time for questions and fully participated in treatment planning.  The patient was encouraged to call the clinic with any questions or concerns.  The patient was informed of access to emergency care. If patient were to develop any significant symptomatology, suicidal ideation, homicidal ideation, any concerns, or feel unsafe at any time they are to call the clinic and if unable to get immediate assistance should immediately call 911 or go to the nearest emergency room.  Patient contracted verbally for the following: If you are experiencing an emotional crisis or have thoughts of harming yourself or others, please go to your nearest local emergency room or call 911. Will continue to re-assess medication response and side effects frequently to establish efficacy and ensure safety. Risks, any black box  warnings, side effects, off label usage, and benefits of medication and treatment discussed with patient, along with potential adverse side effects of current and/or newly prescribed medication, alternative treatment options, and OTC medications.  Patient verbalized understanding of potential risks, any off label use of medication, any black box warnings, and any side effects in their own words. The patient verbalized understanding and agreed to comply with the safety plan discussed in their own words.  Patient given the number to the office. Number also discussed of the 24- hour suicide hotline.           MEDS ORDERED DURING VISIT:  New Medications Ordered This Visit   Medications    FLUoxetine (PROzac) 40 MG capsule     Sig: Take 2 capsules by mouth Daily.     Dispense:  60 capsule     Refill:  0    mirtazapine (REMERON) 15 MG tablet     Sig: Take 1 tablet by mouth Every Night.     Dispense:  30 tablet     Refill:  0    hydrOXYzine (ATARAX) 50 MG tablet     Sig: Take 1 tablet by mouth 3 (Three) Times a Day As Needed for Anxiety.     Dispense:  90 tablet     Refill:  0       Future Appointments         Provider Department Center    7/31/2025 8:00 AM Elisabeth Nava [facility]    8/7/2025 10:15 AM (Arrive by 10:00 AM) Priyank Day MD [facility] [place]    8/14/2025 8:00 AM [facility]    8/27/2025 10:30 AM Christine Oneil APRN [facility]    8/28/2025 8:00 AM [facility]    9/5/2025 11:15 AM (Arrive by 11:00 AM) Sudhir Gan APRN [facility] [place]    9/24/2025 2:30 PM (Arrive by 2:15 PM) Sharlene Dawn MD [facility]UR[facility] [place]              Return in about 4 weeks (around 8/26/2025), or if symptoms worsen or fail to  improve, for Recheck.    The patient will follow-up in approximately 4 weeks, and follow-up appointment has been scheduled with the patient confirming agreeability/availability for date/time during today's encounter.  Instructed the patient to contact the [facility] if they have any questions or concerns, and that a sooner appointment will be provided if needed.  The patient verbalized understanding in their own words and endorsed that they are agreeable to this.             Functional Status: Moderate impairment     Prognosis: Good with Ongoing Treatment             This document has been electronically signed by MERYL Sims  July 29, 2025 15:08 EDT      Some of the data in this electronic note has been brought forward from a previous encounter, any necessary changes have been made, it has been reviewed by this APRN, and it is accurate.       Part of this note may be an electronic transcription/translation of spoken language to printed text using the Dragon Dictation System.

## 2025-07-31 ENCOUNTER — TELEMEDICINE (OUTPATIENT)
Dept: PSYCHIATRY | Facility: CLINIC | Age: 45
End: 2025-07-31
Payer: COMMERCIAL

## 2025-07-31 DIAGNOSIS — F41.1 GENERALIZED ANXIETY DISORDER: Primary | ICD-10-CM

## 2025-07-31 NOTE — TREATMENT PLAN
"Multi-Disciplinary Problems (from Behavioral Health Treatment Plan)      Active Problems       Problem: Ineffective Coping  Start Date: 07/31/25      Problem Details: The patient self-scales this problem as a 5 with 10 being the worst.      Patient reported ineffective coping contributes to overall mental health concerns.         Goal Priority Start Date Expected End Date End Date    Patient will demonstrate the ability to initiate new constructive life skills consistently. Medium 07/31/25 01/29/26 --    Goal Details: Progress toward goal:  The patient self-scales their progress related to this goal as a 5 with 10 being the worst.    \"I want to keep working on dealing with my stress.\"    Patient will self-report overall decrease in average daily impact of ineffective coping from 5/10 to at least 4/10 within the next three months.         Goal Intervention Frequency Start Date End Date    Assist patient in identifying healthy coping behaviors. Q Month 07/31/25 --    Intervention Details: Duration of treatment until remission of symptoms.    Patient will learn and implement at least two new coping skills per month with demonstrated mastery of at least three total coping skills within the next three months.                         Reviewed By       Elisabeth Nava LPCC 07/31/25 0817    Elisabeth Nava LPCC 07/31/25 0816                     I have discussed and reviewed this treatment plan with the patient.   "

## 2025-07-31 NOTE — PROGRESS NOTES
Mode of Visit: Video  Location of patient: -HOME-  Location of provider: +HOME+  You have chosen to receive care through a telehealth visit.  The patient has signed the video visit consent form.  The visit included audio and video interaction. No technical issues occurred during this visit.    Date: 2025  Time In: 08:00 AM  Time out: 8:31 AM    This provider is located at [facility], [address] [place] [address], [facility] [place], using a secure [employer] Video Visit through [employer]. Patient is being seen remotely via telehealth at their home address is located in [place]. Patient stated they are in a secure environment for this session. The patient's condition being diagnosed and treated is appropriate for telemedicine. The provider identified themself as well as their credentials. The patient, or  patient's legal guardian consent to be seen remotely, and when consent is given they understand that the consent allows for patient identifiable information to be sent to a third party as needed. They may refuse to be seen remotely at any time. The electronic data is encrypted and password protected, and the patient's or  legal guardian has been advised of the potential risks to privacy not withstanding such measures.   PT Identifiers used: Name and .    You have chosen to receive care through a telehealth visit.  Do you consent to use a video/audio connection for your medical care today? Yes    Subjective   Samina Maya is a 45 y.o. female who presents today for follow up    Chief Complaint:   Chief Complaint   Patient presents with    Anxiety        History of Present Illness:   Anxiety  Visit:  Follow-up  Follow-up visit:     Medication compliance:  %    Symptoms: decreased concentration, excessive worry, insomnia, irritability, muscle tension and restlessness      Frequency:  Most days    Severity:  Moderate    Sleep quality:  Non-restorative    Nighttime awakenings:  Often     "Treatments tried:  SSRIs, non-benzodiazephine anxiolytics and lifestyle changes    Improvement on treatment:  Moderate         Clinical Maneuvering/Intervention:      Assisted patient in processing above session content; acknowledged and normalized patient’s thoughts, feelings, and concerns.  Rationalized patient thought process regarding concerns presented at session.  Discussed triggers associated with patient's  anxiety  Also discussed coping skills for patient to implement such as self care , positive self talk , and cognitive behavioral strategies    Allowed patient to freely discuss issues without interruption or judgment. Provided safe, confidential environment to facilitate the development of positive therapeutic relationship and encourage open, honest communication. Assisted patient in identifying risk factors which would indicate the need for higher level of care including thoughts to harm self or others and/or self-harming behavior and encouraged patient to contact this office, call 911, or present to the nearest emergency room should any of these events occur. Discussed crisis intervention services and means to access. Patient adamantly and convincingly denies current suicidal or homicidal ideation or perceptual disturbance.    Assessment: Patient reported no active suicidal ideations, self injurious behaviors, or homicidal ideations.  Patient reported continued sporadic sleeping patterns.  Patient reported continued sporadic appetite.  Therapist continued to use Rogerian focused interventions with patient to build rapport and to provide unconditional positive regard.  Patient and therapist updated clinical treatment plan in session today.  Patient and therapist continue to explore triggers for anxiety with patient identifying \"having to deal with all of this of mom's since she passed away\" as primary trigger.  Patient and therapist worked to process this trigger while discussing appropriate coping " strategies including an increase in daily self-care, positive daily self talk, and cognitive behavioral strategies to assist her in identifying and refuting negative thought processes.    Patient appears to maintain relative stability as compared to their baseline.  However, patient continues to struggle with   Chief Complaint   Patient presents with    Anxiety    which continues to cause impairment in important areas of functioning.  A result, they can be reasonably expected to continue to benefit from treatment and would likely be at increased risk for decompensation otherwise.    PHQ-9 Depression Screening  Little interest or pleasure in doing things? Not at all   Feeling down, depressed, or hopeless? Not at all   PHQ-2 Total Score 0   Trouble falling or staying asleep, or sleeping too much? Nearly every day   Feeling tired or having little energy? Not at all   Poor appetite or overeating? Not at all   Feeling bad about yourself - or that you are a failure or have let yourself or your family down? Not at all   Trouble concentrating on things, such as reading the newspaper or watching television? Nearly every day   Moving or speaking so slowly that other people could have noticed? Or the opposite - being so fidgety or restless that you have been moving around a lot more than usual? Not at all     Thoughts that you would be better off dead, or of hurting yourself in some way? Not at all   PHQ-9 Total Score 6   If you checked off any problems, how difficult have these problems made it for you to do your work, take care of things at home, or get along with other people? Not difficult at all           CONSTANZA-7 Score Total:  Over the last two weeks, how often have you been bothered by the following problems?  Feeling nervous, anxious or on edge: Not at all  Not being able to stop or control worrying: Several days  Worrying too much about different things: Nearly every day  Trouble Relaxing: Several days  Being so restless  that it is hard to sit still: Nearly every day  Becoming easily annoyed or irritable: Several days  Feeling afraid as if something awful might happen: Nearly every day  CONSTANZA 7 Total Score: 12  If you checked any problems, how difficult have these problems made it for you to do your work, take care of things at home, or get along with other people: Somewhat difficult      MENTAL STATUS EXAM   General Appearance:  Cleanly groomed and dressed and well developed  Eye Contact:  Good eye contact  Attitude:  Cooperative and polite  Motor Activity:  Normal gait, posture  Muscle Strength:  Normal  Speech:  Normal rate, tone, volume  Language:  Stereotypical  Mood and affect:  Normal, pleasant, appropriate, mood congruent and euthymic  Hopelessness:  Denies  Loneliness: Denies  Thought Process:  Logical and goal-directed  Associations/ Thought Content:  No delusions  Hallucinations:  None  Suicidal Ideations:  Not present  Homicidal Ideation:  Not present  Sensorium:  Alert and clear  Orientation:  Person, place, time and situation  Immediate Recall, Recent, and Remote Memory:  Intact  Attention Span/ Concentration:  Good  Fund of Knowledge:  Appropriate for age and educational level  Intellectual Functioning:  Average range  Insight:  Good  Judgement:  Good  Reliability:  Good  Impulse Control:  Good         Patient's Support Network Includes:  extended family    Progress toward goal: Not at goal    Prognosis: Good with Ongoing Treatment     Plan: Patient will continue ongoing therapeutic mental health services to assist with stabilization and overall improved quality of life through the use of coping skills and psychoeducation.       Patient will continue in individual outpatient therapy with focus on improved functioning and coping skills, maintaining stability, and avoiding decompensation and the need for higher level of care.    Patient will adhere to medication regimen as prescribed and report any side effects. Patient  will contact this office, call 911 or present to the nearest emergency room should suicidal or homicidal ideations occur. Provide Cognitive Behavioral Therapy and Solution Focused Therapy to improve functioning, maintain stability, and avoid decompensation and the need for higher level of care.     Return in about 2 weeks (around 8/14/2025) for Video visit, Next scheduled follow up.      VISIT DIAGNOSIS:    Diagnosis Plan   1. Generalized anxiety disorder                      This document has been electronically signed by SHIVAM Meneses  July 31, 2025      Part of this note may be an electronic transcription/translation of spoken language to printed text using the Dragon Dictation System.

## 2025-08-07 ENCOUNTER — OFFICE VISIT (OUTPATIENT)
Dept: CARDIOLOGY | Facility: CLINIC | Age: 45
End: 2025-08-07
Payer: COMMERCIAL

## 2025-08-07 VITALS
WEIGHT: 127 LBS | SYSTOLIC BLOOD PRESSURE: 158 MMHG | BODY MASS INDEX: 21.16 KG/M2 | HEART RATE: 73 BPM | HEIGHT: 65 IN | DIASTOLIC BLOOD PRESSURE: 84 MMHG | RESPIRATION RATE: 18 BRPM | OXYGEN SATURATION: 99 %

## 2025-08-07 DIAGNOSIS — E78.2 MIXED HYPERLIPIDEMIA: ICD-10-CM

## 2025-08-07 DIAGNOSIS — I25.10 CORONARY ARTERY DISEASE INVOLVING NATIVE CORONARY ARTERY OF NATIVE HEART WITHOUT ANGINA PECTORIS: Primary | ICD-10-CM

## 2025-08-07 DIAGNOSIS — I10 ESSENTIAL HYPERTENSION: ICD-10-CM

## 2025-08-07 PROBLEM — I25.2 HISTORY OF MI (MYOCARDIAL INFARCTION): Status: RESOLVED | Noted: 2024-02-19 | Resolved: 2025-08-07

## 2025-08-07 PROCEDURE — 93000 ELECTROCARDIOGRAM COMPLETE: CPT | Performed by: INTERNAL MEDICINE

## 2025-08-07 PROCEDURE — 3077F SYST BP >= 140 MM HG: CPT | Performed by: INTERNAL MEDICINE

## 2025-08-07 PROCEDURE — 1159F MED LIST DOCD IN RCRD: CPT | Performed by: INTERNAL MEDICINE

## 2025-08-07 PROCEDURE — 1160F RVW MEDS BY RX/DR IN RCRD: CPT | Performed by: INTERNAL MEDICINE

## 2025-08-07 PROCEDURE — 3079F DIAST BP 80-89 MM HG: CPT | Performed by: INTERNAL MEDICINE

## 2025-08-07 PROCEDURE — 99214 OFFICE O/P EST MOD 30 MIN: CPT | Performed by: INTERNAL MEDICINE

## 2025-08-07 RX ORDER — METOPROLOL TARTRATE 25 MG/1
25 TABLET, FILM COATED ORAL 2 TIMES DAILY
Qty: 180 TABLET | Refills: 3 | Status: SHIPPED | OUTPATIENT
Start: 2025-08-07

## 2025-08-07 RX ORDER — ATORVASTATIN CALCIUM 40 MG/1
40 TABLET, FILM COATED ORAL NIGHTLY
Qty: 90 TABLET | Refills: 3 | Status: SHIPPED | OUTPATIENT
Start: 2025-08-07

## 2025-08-07 RX ORDER — VALSARTAN 320 MG/1
320 TABLET ORAL DAILY
Qty: 90 TABLET | Refills: 3 | Status: SHIPPED | OUTPATIENT
Start: 2025-08-07

## 2025-08-25 ENCOUNTER — OFFICE VISIT (OUTPATIENT)
Dept: FAMILY MEDICINE CLINIC | Facility: CLINIC | Age: 45
End: 2025-08-25
Payer: COMMERCIAL

## 2025-08-25 VITALS
SYSTOLIC BLOOD PRESSURE: 132 MMHG | HEIGHT: 65 IN | OXYGEN SATURATION: 99 % | WEIGHT: 129.5 LBS | BODY MASS INDEX: 21.58 KG/M2 | DIASTOLIC BLOOD PRESSURE: 84 MMHG | HEART RATE: 53 BPM

## 2025-08-25 DIAGNOSIS — R73.03 PREDIABETES: ICD-10-CM

## 2025-08-25 DIAGNOSIS — E55.9 VITAMIN D DEFICIENCY: ICD-10-CM

## 2025-08-25 DIAGNOSIS — Z12.11 SCREENING FOR COLON CANCER: ICD-10-CM

## 2025-08-25 DIAGNOSIS — Z79.899 ENCOUNTER FOR LONG-TERM (CURRENT) USE OF MEDICATIONS: ICD-10-CM

## 2025-08-25 DIAGNOSIS — R74.8 ELEVATED LIVER ENZYMES: Primary | ICD-10-CM

## 2025-08-25 DIAGNOSIS — I10 ESSENTIAL HYPERTENSION: ICD-10-CM

## 2025-08-26 LAB
25(OH)D3+25(OH)D2 SERPL-MCNC: 30.4 NG/ML (ref 30–100)
ALBUMIN SERPL-MCNC: 4.3 G/DL (ref 3.9–4.9)
ALP SERPL-CCNC: 120 IU/L (ref 44–121)
ALT SERPL-CCNC: 98 IU/L (ref 0–32)
AST SERPL-CCNC: 64 IU/L (ref 0–40)
BASOPHILS # BLD AUTO: 0.1 X10E3/UL (ref 0–0.2)
BASOPHILS NFR BLD AUTO: 1 %
BILIRUB SERPL-MCNC: 0.3 MG/DL (ref 0–1.2)
BUN SERPL-MCNC: 11 MG/DL (ref 6–24)
BUN/CREAT SERPL: 13 (ref 9–23)
CALCIUM SERPL-MCNC: 9.5 MG/DL (ref 8.7–10.2)
CHLORIDE SERPL-SCNC: 99 MMOL/L (ref 96–106)
CO2 SERPL-SCNC: 25 MMOL/L (ref 20–29)
CREAT SERPL-MCNC: 0.83 MG/DL (ref 0.57–1)
EGFRCR SERPLBLD CKD-EPI 2021: 89 ML/MIN/1.73
EOSINOPHIL # BLD AUTO: 0.1 X10E3/UL (ref 0–0.4)
EOSINOPHIL NFR BLD AUTO: 1 %
ERYTHROCYTE [DISTWIDTH] IN BLOOD BY AUTOMATED COUNT: 12.1 % (ref 11.7–15.4)
GLOBULIN SER CALC-MCNC: 2.3 G/DL (ref 1.5–4.5)
GLUCOSE SERPL-MCNC: 106 MG/DL (ref 70–99)
HBA1C MFR BLD: 5.6 % (ref 4.8–5.6)
HCT VFR BLD AUTO: 44.7 % (ref 34–46.6)
HGB BLD-MCNC: 14.6 G/DL (ref 11.1–15.9)
IMM GRANULOCYTES # BLD AUTO: 0 X10E3/UL (ref 0–0.1)
IMM GRANULOCYTES NFR BLD AUTO: 0 %
LYMPHOCYTES # BLD AUTO: 3.4 X10E3/UL (ref 0.7–3.1)
LYMPHOCYTES NFR BLD AUTO: 34 %
MCH RBC QN AUTO: 29.3 PG (ref 26.6–33)
MCHC RBC AUTO-ENTMCNC: 32.7 G/DL (ref 31.5–35.7)
MCV RBC AUTO: 90 FL (ref 79–97)
MONOCYTES # BLD AUTO: 0.7 X10E3/UL (ref 0.1–0.9)
MONOCYTES NFR BLD AUTO: 7 %
NEUTROPHILS # BLD AUTO: 5.6 X10E3/UL (ref 1.4–7)
NEUTROPHILS NFR BLD AUTO: 57 %
PLATELET # BLD AUTO: 319 X10E3/UL (ref 150–450)
POTASSIUM SERPL-SCNC: 4.5 MMOL/L (ref 3.5–5.2)
PROT SERPL-MCNC: 6.6 G/DL (ref 6–8.5)
RBC # BLD AUTO: 4.99 X10E6/UL (ref 3.77–5.28)
SODIUM SERPL-SCNC: 138 MMOL/L (ref 134–144)
WBC # BLD AUTO: 10 X10E3/UL (ref 3.4–10.8)

## 2025-08-27 ENCOUNTER — TELEMEDICINE (OUTPATIENT)
Dept: PSYCHIATRY | Facility: CLINIC | Age: 45
End: 2025-08-27
Payer: COMMERCIAL

## 2025-08-27 ENCOUNTER — TELEPHONE (OUTPATIENT)
Dept: PSYCHIATRY | Facility: CLINIC | Age: 45
End: 2025-08-27

## 2025-08-27 DIAGNOSIS — Z53.21 PATIENT LEFT WITHOUT BEING SEEN: ICD-10-CM

## 2025-08-27 DIAGNOSIS — F33.0 MILD EPISODE OF RECURRENT MAJOR DEPRESSIVE DISORDER: Primary | Chronic | ICD-10-CM

## 2025-08-27 DIAGNOSIS — F41.1 GENERALIZED ANXIETY DISORDER: Chronic | ICD-10-CM

## 2025-08-27 DIAGNOSIS — F99 INSOMNIA DUE TO OTHER MENTAL DISORDER: Chronic | ICD-10-CM

## 2025-08-27 DIAGNOSIS — F51.05 INSOMNIA DUE TO OTHER MENTAL DISORDER: Chronic | ICD-10-CM

## 2025-08-28 ENCOUNTER — TELEMEDICINE (OUTPATIENT)
Dept: PSYCHIATRY | Facility: CLINIC | Age: 45
End: 2025-08-28
Payer: COMMERCIAL

## 2025-08-28 ENCOUNTER — TELEPHONE (OUTPATIENT)
Dept: PSYCHIATRY | Facility: CLINIC | Age: 45
End: 2025-08-28
Payer: COMMERCIAL

## 2025-08-28 DIAGNOSIS — F41.1 GENERALIZED ANXIETY DISORDER: Primary | ICD-10-CM
